# Patient Record
Sex: MALE | Race: OTHER | HISPANIC OR LATINO | URBAN - METROPOLITAN AREA
[De-identification: names, ages, dates, MRNs, and addresses within clinical notes are randomized per-mention and may not be internally consistent; named-entity substitution may affect disease eponyms.]

---

## 2018-11-05 RX ORDER — CIPROFLOXACIN LACTATE 400MG/40ML
1 VIAL (ML) INTRAVENOUS
Qty: 0 | Refills: 0 | COMMUNITY
Start: 2018-11-05

## 2018-11-07 ENCOUNTER — INPATIENT (INPATIENT)
Facility: HOSPITAL | Age: 23
LOS: 5 days | Discharge: HOME | DRG: 180 | End: 2018-11-13
Payer: COMMERCIAL

## 2018-11-07 VITALS
TEMPERATURE: 98 F | OXYGEN SATURATION: 94 % | DIASTOLIC BLOOD PRESSURE: 68 MMHG | WEIGHT: 216.05 LBS | SYSTOLIC BLOOD PRESSURE: 112 MMHG | RESPIRATION RATE: 16 BRPM | HEART RATE: 92 BPM

## 2018-11-07 DIAGNOSIS — R91.1 SOLITARY PULMONARY NODULE: ICD-10-CM

## 2018-11-07 DIAGNOSIS — Z29.9 ENCOUNTER FOR PROPHYLACTIC MEASURES, UNSPECIFIED: ICD-10-CM

## 2018-11-07 DIAGNOSIS — J45.909 UNSPECIFIED ASTHMA, UNCOMPLICATED: ICD-10-CM

## 2018-11-07 DIAGNOSIS — R04.2 HEMOPTYSIS: ICD-10-CM

## 2018-11-07 DIAGNOSIS — R63.8 OTHER SYMPTOMS AND SIGNS CONCERNING FOOD AND FLUID INTAKE: ICD-10-CM

## 2018-11-07 DIAGNOSIS — Z91.89 OTHER SPECIFIED PERSONAL RISK FACTORS, NOT ELSEWHERE CLASSIFIED: ICD-10-CM

## 2018-11-07 LAB
ALBUMIN SERPL ELPH-MCNC: 4.9 G/DL — SIGNIFICANT CHANGE UP (ref 3.3–5)
ALP SERPL-CCNC: 55 U/L — SIGNIFICANT CHANGE UP (ref 40–120)
ALT FLD-CCNC: 21 U/L — SIGNIFICANT CHANGE UP (ref 10–45)
ANION GAP SERPL CALC-SCNC: 15 MMOL/L — SIGNIFICANT CHANGE UP (ref 5–17)
APTT BLD: 29.5 SEC — SIGNIFICANT CHANGE UP (ref 27.5–36.3)
AST SERPL-CCNC: 18 U/L — SIGNIFICANT CHANGE UP (ref 10–40)
BASOPHILS NFR BLD AUTO: 0.1 % — SIGNIFICANT CHANGE UP (ref 0–2)
BILIRUB SERPL-MCNC: 0.5 MG/DL — SIGNIFICANT CHANGE UP (ref 0.2–1.2)
BUN SERPL-MCNC: 10 MG/DL — SIGNIFICANT CHANGE UP (ref 7–23)
CALCIUM SERPL-MCNC: 10 MG/DL — SIGNIFICANT CHANGE UP (ref 8.4–10.5)
CHLORIDE SERPL-SCNC: 100 MMOL/L — SIGNIFICANT CHANGE UP (ref 96–108)
CO2 SERPL-SCNC: 24 MMOL/L — SIGNIFICANT CHANGE UP (ref 22–31)
CREAT SERPL-MCNC: 1.13 MG/DL — SIGNIFICANT CHANGE UP (ref 0.5–1.3)
GLUCOSE SERPL-MCNC: 117 MG/DL — HIGH (ref 70–99)
HCT VFR BLD CALC: 46.2 % — SIGNIFICANT CHANGE UP (ref 39–50)
HGB BLD-MCNC: 15.7 G/DL — SIGNIFICANT CHANGE UP (ref 13–17)
INR BLD: 1.18 — HIGH (ref 0.88–1.16)
LYMPHOCYTES # BLD AUTO: 8.9 % — LOW (ref 13–44)
MCHC RBC-ENTMCNC: 30.4 PG — SIGNIFICANT CHANGE UP (ref 27–34)
MCHC RBC-ENTMCNC: 34 G/DL — SIGNIFICANT CHANGE UP (ref 32–36)
MCV RBC AUTO: 89.5 FL — SIGNIFICANT CHANGE UP (ref 80–100)
MONOCYTES NFR BLD AUTO: 2.1 % — SIGNIFICANT CHANGE UP (ref 2–14)
NEUTROPHILS NFR BLD AUTO: 88.9 % — HIGH (ref 43–77)
PLATELET # BLD AUTO: 317 K/UL — SIGNIFICANT CHANGE UP (ref 150–400)
POTASSIUM SERPL-MCNC: 4.6 MMOL/L — SIGNIFICANT CHANGE UP (ref 3.5–5.3)
POTASSIUM SERPL-SCNC: 4.6 MMOL/L — SIGNIFICANT CHANGE UP (ref 3.5–5.3)
PROT SERPL-MCNC: 8.3 G/DL — SIGNIFICANT CHANGE UP (ref 6–8.3)
PROTHROM AB SERPL-ACNC: 13.4 SEC — HIGH (ref 10–12.9)
RBC # BLD: 5.16 M/UL — SIGNIFICANT CHANGE UP (ref 4.2–5.8)
RBC # FLD: 13.1 % — SIGNIFICANT CHANGE UP (ref 10.3–16.9)
SODIUM SERPL-SCNC: 139 MMOL/L — SIGNIFICANT CHANGE UP (ref 135–145)
WBC # BLD: 8.4 K/UL — SIGNIFICANT CHANGE UP (ref 3.8–10.5)
WBC # FLD AUTO: 8.4 K/UL — SIGNIFICANT CHANGE UP (ref 3.8–10.5)

## 2018-11-07 PROCEDURE — 99222 1ST HOSP IP/OBS MODERATE 55: CPT | Mod: GC

## 2018-11-07 PROCEDURE — 99285 EMERGENCY DEPT VISIT HI MDM: CPT

## 2018-11-07 PROCEDURE — 71250 CT THORAX DX C-: CPT | Mod: 26

## 2018-11-07 RX ORDER — AZITHROMYCIN 500 MG/1
500 TABLET, FILM COATED ORAL ONCE
Qty: 0 | Refills: 0 | Status: COMPLETED | OUTPATIENT
Start: 2018-11-07 | End: 2018-11-07

## 2018-11-07 RX ORDER — AZITHROMYCIN 500 MG/1
500 TABLET, FILM COATED ORAL EVERY 24 HOURS
Qty: 0 | Refills: 0 | Status: DISCONTINUED | OUTPATIENT
Start: 2018-11-08 | End: 2018-11-13

## 2018-11-07 RX ORDER — CEFTRIAXONE 500 MG/1
1 INJECTION, POWDER, FOR SOLUTION INTRAMUSCULAR; INTRAVENOUS ONCE
Qty: 0 | Refills: 0 | Status: COMPLETED | OUTPATIENT
Start: 2018-11-07 | End: 2018-11-07

## 2018-11-07 RX ORDER — ALBUTEROL 90 UG/1
2 AEROSOL, METERED ORAL
Qty: 0 | Refills: 0 | COMMUNITY

## 2018-11-07 RX ORDER — CEFTRIAXONE 500 MG/1
1 INJECTION, POWDER, FOR SOLUTION INTRAMUSCULAR; INTRAVENOUS EVERY 24 HOURS
Qty: 0 | Refills: 0 | Status: DISCONTINUED | OUTPATIENT
Start: 2018-11-08 | End: 2018-11-13

## 2018-11-07 RX ADMIN — AZITHROMYCIN 255 MILLIGRAM(S): 500 TABLET, FILM COATED ORAL at 21:23

## 2018-11-07 RX ADMIN — CEFTRIAXONE 1 GRAM(S): 500 INJECTION, POWDER, FOR SOLUTION INTRAMUSCULAR; INTRAVENOUS at 20:00

## 2018-11-07 RX ADMIN — Medication 125 MILLIGRAM(S): at 17:03

## 2018-11-07 RX ADMIN — CEFTRIAXONE 100 GRAM(S): 500 INJECTION, POWDER, FOR SOLUTION INTRAMUSCULAR; INTRAVENOUS at 19:31

## 2018-11-07 NOTE — H&P ADULT - PROBLEM SELECTOR PLAN 3
History of Asthma. Controlled and Stable. Diagnosed 1 month ago. Controlled and Stable. On Flonase and Ventolin Inhaler BID.   - No wheezing appreciated on exam. Currently asymptomatic.

## 2018-11-07 NOTE — ED PROVIDER NOTE - PROGRESS NOTE DETAILS
notified by pt significant other that he coughed up blood into paper towel; assessed at bedside, estimated about 10-12cc bright red blood with punctate clots mixed with saliva. Also discussed case w/ Dr. Ashwini Ratliff in person who provides faxed copy of pertinent findings on outpatient CTA chest 11/6/18 -- read as soft tissue mass of the superior segment of LLL with [id number] size; ddx poss bronchogenic CA or lymphoma w/ poss L hilar mass; no evidence of PE or effusion. Will have scanned to chart.    Will obtain basic labs, coags, and give 125 solumedrol. discussed case w/ pulm Dr. Varela and saw with pt; will obtain NCCCT, tx with cfx/azithro for poss infectious etiology though to r/o malignancy will need admission for bronch tmrw, NPOpMN

## 2018-11-07 NOTE — H&P ADULT - HISTORY OF PRESENT ILLNESS
In the ED, VS were T(F): 98 (Max: 98.2), HR: 94 (92 - 94), BP: 124/75 (112/68 - 124/75), RR: 16, SpO2: 97% (94% - 97%) on RA.   CT Chest No Cont showing multilobulated masslike structure in the left lower lobe superior segment measuring approximately 6.0 x 2.5 x 4.1 cm high. The lesion extends into the left lower lobe and the superior segmental bronchi. The left hilum is prominent. Dilated bronchi are seen in the superior segment of the left lower lobe. EKG  Labs significant for WBC 8.4, NT 88%.   Received Ceftriaxone 1g IV x1, Azithromycin 500mg IV x1, Solumedrol 125mg IV x1,   Patient admitted for evaluation of hemoptysis and new lung mass found on CT. A 24yo Male with PMH of adult onset asthma (dx a month ago), frequent respiratory infections, seasonal allergies presents to [facility] after being referred for evaluation by Dr. Ashwini Ratliff after outpatient CTA chest revealed a "LLL lesion" in the setting of 2-3 days of cough and hemoptysis. States he has been experiencing 2-3 days of a shot glass worth of hemoptysis (last today at 4pm with 2-3 napkins of streaked blood, approx 5cc). States he has been wheezing and intermittently coughing (nonproductive) for the past 2 months. Endorses frequent respiratory infections, particularly as a child as well as seasonal allergies. Reports being treated with antibiotics previously for these infections with resolution of symptoms.     	Had BCG vax in 6/2018 (green card process; pt emigrated from [facility] in [id number]) and said started not feeling well weeks after vaccine. Denies prior PPD or QF-gold testing. No h/o TB contacts. Endorses recent sick contact w/ relative's infant with viral illness. Has traveled to [facility] in past 6-12mos. Frequently travels domestically in  part of job as  (last was in [place] few weeks ago). Also endorsed hiking in [place] few weeks ago but denies any exposures or going into caves.        In the ED, VS were T(F): 98 (Max: 98.2), HR: 94 (92 - 94), BP: 124/75 (112/68 - 124/75), RR: 16, SpO2: 97% (94% - 97%) on RA.   CT Chest No Cont showing multilobulated masslike structure in the left lower lobe superior segment measuring approximately 6.0 x 2.5 x 4.1 cm high. The lesion extends into the left lower lobe and the superior segmental bronchi. The left hilum is prominent. Dilated bronchi are seen in the superior segment of the left lower lobe. EKG  Labs significant for WBC 8.4, NT 88%.   Received Ceftriaxone 1g IV x1, Azithromycin 500mg IV x1, Solumedrol 125mg IV x1,   Patient admitted for evaluation of hemoptysis and new lung mass found on CT. A 22yo Male with PMH of adult onset asthma (dx a month ago, Flonase, Ventolin BID), frequent respiratory infections, seasonal allergies presents to [facility] after being referred for evaluation by Dr. Ashwini Ratliff after outpatient CTA chest revealed a "LLL lesion" in the setting of 2-3 days of cough and hemoptysis. States he has been experiencing 2-3 days of a shot glass worth of hemoptysis (last today at 4pm with 2-3 napkins of streaked blood, approx 5cc). Worse with lying flat. Improves with sitting up. States he has been wheezing and intermittently coughing (nonproductive) for the past 2 months. Was given Levaquin and Prednisone 40mg daily two days ago by Urgent Care. Endorses frequent respiratory infections, particularly as a child as well as seasonal allergies. Reports being treated with antibiotics previously for these infections (last one 2 years ago) with resolution of symptoms. Of note, had BCG vaccine in 6/2018 (green card process; patient emigrated from [facility] in [id number]) and said started not feeling well weeks after vaccine. Denies prior PPD or QF-gold testing. No h/o TB contacts. Endorses recent sick contact w/ relative's infant with viral illness and friend's partner. Has traveled to [facility] in past 6-12mos. 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 States he traveled on flights almost 150 times this past year and lives a very busy life. Also endorsed hiking in [place] few weeks ago but denies any exposures or going into caves. Former smoker [id number], 5 cigarettes a day. No RDU. Currently, denies fevers, chills, CP, SOB, abdominal pain, dysphagia, N/V/D, night sweats, urinary symptoms, lower extremity swelling, recent weight loss.     In the ED, VS were T(F): 98 (Max: 98.2), HR: 94 (92 - 94), BP: 124/75 (112/68 - 124/75), RR: 16, SpO2: 97% (94% - 97%) on RA. CT Chest No Cont showing multilobulated masslike structure in the left lower lobe superior segment measuring approximately 6.0 x 2.5 x 4.1 cm high. The lesion extends into the left lower lobe and the superior segmental bronchi. The left hilum is prominent. Dilated bronchi are seen in the superior segment of the left lower lobe. Labs significant for WBC 8.4, NT 88%. Received Ceftriaxone 1g IV x1, Azithromycin 500mg IV x1, Solumedrol 125mg IV x1, Patient admitted for evaluation of hemoptysis and new lung mass found on CT.

## 2018-11-07 NOTE — H&P ADULT - ATTENDING COMMENTS
Patient seen and examined with house-staff during bedside rounds.  Resident note read, including vitals, physical findings, laboratory data, and radiological reports.   Revisions included below.  Direct personal management at bed side and extensive interpretation of the data.  Plan was outlined and discussed in details with the housestaff.  Decision making of high complexity  Action taken for acute disease activity to reflect the level of care provided:  - medication reconciliation  - review laboratory data  The patient was referred to the emergency room because of hemoptysis. Patient has a CT scan report with revealed a mass in the left lower lobe. Patient had multiple episodes of pneumonia in the past. Repeat CT scan of the chest. Start antibiotic. Plan for bronchoscopy. I discussed the case in details with the patient and oncology

## 2018-11-07 NOTE — H&P ADULT - NSHPSOCIALHISTORY_GEN_ALL_CORE
Cigarette Use:  RDU:  ETOH:  Occupation: Cigarette Use: [id number], 5 cigarettes a day  RDU: Denies   ETOH: Socially  Occupation: E-commerce

## 2018-11-07 NOTE — ED ADULT NURSE NOTE - OBJECTIVE STATEMENT
patient presents to ED c/o hemoptysis x 2 days. Pt has CT performed yesterday that showed lesion on lung. pt denies sob, cp, n/v/d

## 2018-11-07 NOTE — H&P ADULT - PROBLEM SELECTOR PLAN 1
CT Chest No Cont showing multilobulated masslike structure in the left lower lobe superior segment measuring approximately 6.0 x 2.5 x 4.1 cm high. The lesion extends into the left lower lobe and the superior segmental bronchi. The left hilum is prominent. Dilated bronchi are seen in the superior segment of the left lower lobe.   - Plan for Bronchoscopy tomorrow with Dr. Varela  - HERONO after Midnight CT Chest No Cont showing multilobulated masslike structure in the left lower lobe superior segment measuring approximately 6.0 x 2.5 x 4.1 cm high. The lesion extends into the left lower lobe and the superior segmental bronchi. The left hilum is prominent. Dilated bronchi are seen in the superior segment of the left lower lobe.   - Plan for Bronchoscopy tomorrow with Dr. Varela  - Type and Screen Active  - NPO after Midnight  - f/u AM labs, EKG

## 2018-11-07 NOTE — ED PROVIDER NOTE - MEDICAL DECISION MAKING DETAILS
22yo M presenting with 2-3d of "shot-glass"-worth intermittent hemoptysis, with outside CTA demonstrating a LLL lesion of concern for malignant vs. infectious etiology; hemoptysis was witnessed in ED, VSS; discussed case w/ pulm Dr. Varela, check NCCCT in ED, basic labs, empiric abx CFX/azithro for poss PNA, and admit for bronchoscopy tomorrow for LLL lesion

## 2018-11-07 NOTE — H&P ADULT - ASSESSMENT
A 22yo Male with PMH of adult onset asthma (dx a month ago, Flonase, Ventolin BID), frequent respiratory infections, seasonal allergies presents to [facility] after being referred for evaluation by Dr. Ashwini Ratliff after outpatient CTA chest revealed a "LLL lesion" in the setting of 2-3 days of cough and hemoptysis, with inpatient [facility] showing multilobulated masslike structure in the left lower lobe superior segment measuring approximately 6.0 x 2.5 x 4.1 cm high extending into the left lower lobe and the superior segmental bronchi, admitted for evaluation of hemoptysis in setting of new lung mass.

## 2018-11-07 NOTE — ED PROVIDER NOTE - ATTENDING CONTRIBUTION TO CARE
23 m no sig pmh- w multiple childhood resp infection-chronic cough hemoptysis x 3 days- small amt, shot glass size  got bcg in june- August had scant hemoptysis- mild amt of streaking/phlegm  had CT angio 23 m no sig pmh- w multiple childhood resp infection-chronic cough hemoptysis x 3 days- small amt, shot glass size  got bcg in june- August had scant hemoptysis- mild amt of streaking/phlegm  had CT angio- shows Left lower lober 'lesion' sent in by Dr Ratliff to see Dr Varela  int night sweats  vss  s1s2 lungs cta bl  abd soft nt nd +bs  ext no c/c/e  IMP- Cough- scant hemoptysis  w abnml CT  labs, consult Pulm

## 2018-11-07 NOTE — ED ADULT NURSE NOTE - NSIMPLEMENTINTERV_GEN_ALL_ED
Implemented All Universal Safety Interventions:  [place] to call system. Call bell, personal items and telephone within reach. Instruct patient to call for assistance. Room bathroom lighting operational. Non-slip footwear when patient is off stretcher. Physically safe environment: no spills, clutter or unnecessary equipment. Stretcher in lowest position, wheels locked, appropriate side rails in place.

## 2018-11-07 NOTE — H&P ADULT - NSHPLABSRESULTS_GEN_ALL_CORE
LABS:                        15.7   8.4   )-----------( 317      ( 07 Nov 2018 17:31 )             46.2     11-07    139  |  100  |  10  ----------------------------<  117<H>  4.6   |  24  |  1.13    Ca    10.0      07 Nov 2018 17:31    TPro  8.3  /  Alb  4.9  /  TBili  0.5  /  DBili  x   /  AST  18  /  ALT  21  /  AlkPhos  55  11-07    PT/INR - ( 07 Nov 2018 17:31 )   PT: 13.4 sec;   INR: 1.18          PTT - ( 07 Nov 2018 17:31 )  PTT:29.5 sec

## 2018-11-07 NOTE — H&P ADULT - NSHPOUTPATIENTPROVIDERS_GEN_ALL_CORE
PCP:  Pharmacy: PCP: None  Pharmacy: [facility] in [place], [place]; [facility] PCP: None  Wife: Odilia Cavazos [phone]  Pharmacy: [facility] in [place], [place]; [facility]

## 2018-11-07 NOTE — H&P ADULT - NSHPPHYSICALEXAM_GEN_ALL_CORE
Vital Signs Last 12 Hrs  T(F): 98 (11-07-18 @ 15:23), Max: 98.2 (11-07-18 @ 14:42)  HR: 94 (11-07-18 @ 15:23) (92 - 94)  BP: 124/75 (11-07-18 @ 15:23) (112/68 - 124/75)  BP(mean): --  RR: 16 (11-07-18 @ 15:23) (16 - 16)  SpO2: 97% (11-07-18 @ 15:23) (94% - 97%)  I&O's Summary      PHYSICAL EXAM:    Constitutional: NAD, AAOx3, comfortable in bed.   Respiratory: Normal rate, rhythm, depth, effort. CTAB. No w/r/r.   Cardiovascular: RRR, normal S1 and S2, no m/r/g.   Gastrointestinal: +BS, soft NTND.   Extremities: wwp, no edema.   Vascular: Pulses equal and strong throughout.   Neurological: Cranial nerves grossly intact, strength and sensation intact throughout.   Skin: No gross skin abnormalities. Vital Signs Last 12 Hrs  T(F): 98 (11-07-18 @ 15:23), Max: 98.2 (11-07-18 @ 14:42)  HR: 94 (11-07-18 @ 15:23) (92 - 94)  BP: 124/75 (11-07-18 @ 15:23) (112/68 - 124/75)  BP(mean): --  RR: 16 (11-07-18 @ 15:23) (16 - 16)  SpO2: 97% (11-07-18 @ 15:23) (94% - 97%)  I&O's Summary      PHYSICAL EXAM:    Constitutional: Resting comfortably in bed in NAD, AAOx3, Anxious  HEENT: PERRL, EOMI, anicteric, MMM, neck supple, no JVD, no thyromegaly  Respiratory: Slight decrease in BS AMANDEEP, otherwise, no wheezing rhonchi or rales appreciated  Cardiovascular: RRR, normal S1 and S2, no m/r/g.   Gastrointestinal: +BS x 4, soft NTND. No peritoneal signs.   Extremities: wwp, no edema.   Vascular: Pulses equal and strong throughout.   Neurological: Cranial nerves grossly intact, strength and sensation intact throughout.   Skin: No gross skin abnormalities.

## 2018-11-07 NOTE — H&P ADULT - PROBLEM SELECTOR PLAN 6
1) PCP Contacted on Admission: (Y/N) --> Name & Phone #:  2) Date of Contact with PCP:  3) PCP Contacted at Discharge: (Y/N, N/A)  4) Summary of Handoff Given to PCP:   5) Post-Discharge Appointment Date and Location: 1) PCP Contacted on Admission: (N) --> Name & Phone #: No PCP, link with Dr. Varela or [facility] post discharge  2) Date of Contact with PCP:  3) PCP Contacted at Discharge: (Y/N, N/A)  4) Summary of Handoff Given to PCP:   5) Post-Discharge Appointment Date and Location:

## 2018-11-07 NOTE — H&P ADULT - PROBLEM SELECTOR PLAN 2
Patient with 2-3 days of hemoptysis, described as shot glass full. Last this afternoon at 4pm with 2-3 napkins, of blood streaks, approx 5-10cc. Currently hemoptysis free.  - Likely in setting of PNA vs TB vs new lung mass found on CT  - f/u AM CBC  - Type and Screen Active  - Maintain 2 Peripheral Large Bore IVs  - Transfusion Goal Hgb > 7  - Plan as described as above.

## 2018-11-07 NOTE — ED ADULT TRIAGE NOTE - CHIEF COMPLAINT QUOTE
Pt referred to ED by DR DARRION ferrari to be seen by DR Varela and for possible bronchoscopy - per records pt has a lesion of LT lower lung field. Pt denies pain, shortness of breath or chest/back pain. Pt reports hemoptysis in the last two days. Pt referred to ED by DR DARRION Ratliff to be seen by DR Varela and for possible bronchoscopy - per records pt has a lesion of LT lower lung field. Pt denies pain, shortness of breath or chest/back pain. Pt reports hemoptysis in the last two days.

## 2018-11-07 NOTE — ED PROVIDER NOTE - OBJECTIVE STATEMENT
22yo M w/ PMH asthma, frequent respiratory infections, seasonal allergies referred to ED for evaluation by Dr. Ashwini Ratliff after outpatient CTA chest revealed a "LLL lesion" in the setting of 2-3 days of cough and hemoptysis. Patient says he has had about a shot glass worth of hemoptysis between Monday and Tuesday/tuesday evening. No hemoptysis today. Has been coughing for the last 4-5 months intermittently. Usually dry, but endorses it is sometimes productive of sputum. Endorses frequent respiratory infections, particularly as a child and seasonal allergies. 24yo M w/ PMH asthma, frequent respiratory infections, seasonal allergies referred to ED for evaluation by Dr. Ashwini Ratliff after outpatient CTA chest revealed a "LLL lesion" in the setting of 2-3 days of cough and hemoptysis. Patient says he has had about a shot glass worth of hemoptysis between Monday and Tuesday/tuesday evening. No hemoptysis today. Has been coughing for the last 4-5 months intermittently. Usually dry, but endorses it is sometimes productive of sputum. Endorses frequent respiratory infections, particularly as a child as well as seasonal allergies.    Had BCG vax in 6/2018 (green card process; pt emigrated from [facility] in [id number]) and said started not feeling well weeks after vaccine. Denies prior PPD or QF-gold testing. No h/o TB contacts. Endorses recent sick contact w/ relative's infant with viral illness. Has traveled to [facility] in past 6-12mos. Frequently travels domestically in  part of job as  (last was in [place] few weeks ago). Also endorsed hiking in [place] few weeks ago but denies any exposures or going into caves.

## 2018-11-08 DIAGNOSIS — D72.829 ELEVATED WHITE BLOOD CELL COUNT, UNSPECIFIED: ICD-10-CM

## 2018-11-08 LAB
ANION GAP SERPL CALC-SCNC: 16 MMOL/L — SIGNIFICANT CHANGE UP (ref 5–17)
BASOPHILS NFR BLD AUTO: 0.1 % — SIGNIFICANT CHANGE UP (ref 0–2)
BUN SERPL-MCNC: 14 MG/DL — SIGNIFICANT CHANGE UP (ref 7–23)
CALCIUM SERPL-MCNC: 10.2 MG/DL — SIGNIFICANT CHANGE UP (ref 8.4–10.5)
CHLORIDE SERPL-SCNC: 101 MMOL/L — SIGNIFICANT CHANGE UP (ref 96–108)
CO2 SERPL-SCNC: 24 MMOL/L — SIGNIFICANT CHANGE UP (ref 22–31)
CREAT SERPL-MCNC: 1.01 MG/DL — SIGNIFICANT CHANGE UP (ref 0.5–1.3)
EOSINOPHIL NFR BLD AUTO: 0 % — SIGNIFICANT CHANGE UP (ref 0–6)
GLUCOSE SERPL-MCNC: 120 MG/DL — HIGH (ref 70–99)
HCT VFR BLD CALC: 45.9 % — SIGNIFICANT CHANGE UP (ref 39–50)
HGB BLD-MCNC: 15.6 G/DL — SIGNIFICANT CHANGE UP (ref 13–17)
LYMPHOCYTES # BLD AUTO: 11.4 % — LOW (ref 13–44)
MAGNESIUM SERPL-MCNC: 2.2 MG/DL — SIGNIFICANT CHANGE UP (ref 1.6–2.6)
MCHC RBC-ENTMCNC: 30.6 PG — SIGNIFICANT CHANGE UP (ref 27–34)
MCHC RBC-ENTMCNC: 34 G/DL — SIGNIFICANT CHANGE UP (ref 32–36)
MCV RBC AUTO: 90 FL — SIGNIFICANT CHANGE UP (ref 80–100)
MONOCYTES NFR BLD AUTO: 8.1 % — SIGNIFICANT CHANGE UP (ref 2–14)
NEUTROPHILS NFR BLD AUTO: 80.4 % — HIGH (ref 43–77)
PLATELET # BLD AUTO: 314 K/UL — SIGNIFICANT CHANGE UP (ref 150–400)
POTASSIUM SERPL-MCNC: 4.4 MMOL/L — SIGNIFICANT CHANGE UP (ref 3.5–5.3)
POTASSIUM SERPL-SCNC: 4.4 MMOL/L — SIGNIFICANT CHANGE UP (ref 3.5–5.3)
RBC # BLD: 5.1 M/UL — SIGNIFICANT CHANGE UP (ref 4.2–5.8)
RBC # FLD: 13.1 % — SIGNIFICANT CHANGE UP (ref 10.3–16.9)
SODIUM SERPL-SCNC: 141 MMOL/L — SIGNIFICANT CHANGE UP (ref 135–145)
WBC # BLD: 16.4 K/UL — HIGH (ref 3.8–10.5)
WBC # FLD AUTO: 16.4 K/UL — HIGH (ref 3.8–10.5)

## 2018-11-08 PROCEDURE — 93010 ELECTROCARDIOGRAM REPORT: CPT

## 2018-11-08 PROCEDURE — 99232 SBSQ HOSP IP/OBS MODERATE 35: CPT | Mod: GC

## 2018-11-08 PROCEDURE — 71275 CT ANGIOGRAPHY CHEST: CPT | Mod: 26

## 2018-11-08 RX ORDER — IPRATROPIUM/ALBUTEROL SULFATE 18-103MCG
3 AEROSOL WITH ADAPTER (GRAM) INHALATION EVERY 6 HOURS
Qty: 0 | Refills: 0 | Status: DISCONTINUED | OUTPATIENT
Start: 2018-11-08 | End: 2018-11-13

## 2018-11-08 RX ORDER — IPRATROPIUM/ALBUTEROL SULFATE 18-103MCG
3 AEROSOL WITH ADAPTER (GRAM) INHALATION EVERY 6 HOURS
Qty: 0 | Refills: 0 | Status: DISCONTINUED | OUTPATIENT
Start: 2018-11-08 | End: 2018-11-08

## 2018-11-08 RX ADMIN — CEFTRIAXONE 100 GRAM(S): 500 INJECTION, POWDER, FOR SOLUTION INTRAMUSCULAR; INTRAVENOUS at 17:15

## 2018-11-08 RX ADMIN — AZITHROMYCIN 255 MILLIGRAM(S): 500 TABLET, FILM COATED ORAL at 18:36

## 2018-11-08 NOTE — PROGRESS NOTE ADULT - PROBLEM SELECTOR PLAN 1
Pt with 3 days of shot full of hemoptysis accompanied by wheezing and productive cough, saw Dr. Ratliff on outpatient with CT illustrating LLL lesion, sent to ED for workup of Mass and hemoptysis. Patient endorses night sweats intermittent since BCG vaccine but denies any weight loss. Possible etiologies include malignancy (patient former smoker 5cigs/day for 2 years although less likely young age/not heavy smoker/no fam HX, but mass presence.), vs vasculitis (hemoptysis, episode of malaise, cough, night sweats, but no other signs of epistaxis/ulcerations, hematuria and less likely with mass present), vs TB activation (hemoptysis, night sweats, chronicity of cough, subsequent timing right after BCG vaccine, born in [facility], extensive travel history, but no weight loss, no LN on CT and multilobular) , vs AVM (hemoptysis but less likely with consitutional sx and no other site).   - Hb stable, but will maintain active T&S in light of current episodes  - Plan for bronchoscopy today for evaluation of mass  - Consider vasculitis panel- ANCA Pt with 3 days of shot full of hemoptysis accompanied by wheezing and productive cough, saw Dr. Ratliff on outpatient with CT illustrating LLL lesion, sent to ED for workup of Mass and hemoptysis. Patient endorses night sweats intermittent since BCG vaccine but denies any weight loss. Possible etiologies include AVM (hemoptysis) malignancy (patient former smoker 5cigs/day for 2 years although less likely young age/not heavy smoker/no fam HX, but mass presence.), vs vasculitis (hemoptysis, episode of malaise, cough, night sweats, but no other signs of epistaxis/ulcerations, hematuria and less likely with mass present).   - Hb stable, but will maintain active T&S in light of current episodes  - CT angio w/ IV contrast- for today to rule out AVM   - Plan for bronchoscopy tomorrow for evaluation of mass  - Consider vasculitis/antibodies if negative for AVM on scan   - If severe hemoptysis again, get stat CBC and if not protecting airway/ tachypneic page anesthesia stat for possible intubation   - Heme/Onc following

## 2018-11-08 NOTE — PROGRESS NOTE ADULT - PROBLEM SELECTOR PLAN 4
Recently diagnosed with wheezing on outpatient; currently Recently diagnosed with wheezing on outpatient; currently on outpatient Ventolin/ Fluticasone;   - C/w jose luis

## 2018-11-08 NOTE — PROGRESS NOTE ADULT - SUBJECTIVE AND OBJECTIVE BOX
OVERNIGHT EVENTS: No hemoptysis episodes.     SUBJECTIVE / INTERVAL HPI: Patient seen and examined at bedside. Pt mentions overnight he had episodes of night sweats, which he endorses he has had intermittently since July. His breathing has improved denies any current wheezing, endorsing     VITAL SIGNS:  Vital Signs Last 24 Hrs  T(C): 36.7 (08 Nov 2018 05:35), Max: 36.8 (07 Nov 2018 14:42)  T(F): 98 (08 Nov 2018 05:35), Max: 98.2 (07 Nov 2018 14:42)  HR: 89 (08 Nov 2018 05:35) (89 - 97)  BP: 110/68 (08 Nov 2018 05:35) (110/68 - 125/81)  BP(mean): --  RR: 17 (08 Nov 2018 05:35) (16 - 17)  SpO2: 95% (08 Nov 2018 05:35) (94% - 97%)    PHYSICAL EXAM:    General: WDWN  HEENT: NC/AT; PERRL, anicteric sclera; MMM  Neck: supple  Cardiovascular: +S1/S2, RRR  Respiratory: CTA B/L; no W/R/R  Gastrointestinal: soft, NT/ND; +BSx4  Extremities: WWP; no edema, clubbing or cyanosis  Vascular: 2+ radial, DP/PT pulses B/L  Neurological: AAOx3; no focal deficits    MEDICATIONS:  MEDICATIONS  (STANDING):  azithromycin  IVPB 500 milliGRAM(s) IV Intermittent every 24 hours  cefTRIAXone   IVPB 1 Gram(s) IV Intermittent every 24 hours    MEDICATIONS  (PRN):      ALLERGIES:  Allergies    No Known Allergies    Intolerances        LABS:                        15.6   16.4  )-----------( 314      ( 08 Nov 2018 07:04 )             45.9     11-08    141  |  101  |  14  ----------------------------<  120<H>  4.4   |  24  |  1.01    Ca    10.2      08 Nov 2018 07:04  Mg     2.2     11-08    TPro  8.3  /  Alb  4.9  /  TBili  0.5  /  DBili  x   /  AST  18  /  ALT  21  /  AlkPhos  55  11-07    PT/INR - ( 07 Nov 2018 17:31 )   PT: 13.4 sec;   INR: 1.18          PTT - ( 07 Nov 2018 17:31 )  PTT:29.5 sec    CAPILLARY BLOOD GLUCOSE          RADIOLOGY & ADDITIONAL TESTS: Reviewed. OVERNIGHT EVENTS: No hemoptysis episodes.     SUBJECTIVE / INTERVAL HPI: Patient seen and examined at bedside. Pt mentions overnight he had episodes of night sweats, which he endorses he has had intermittently since July. His breathing has improved denies any current wheezing, endorsing breathing treatment helped. Patient is currently on CFTX/Azithro NPO pending Bronchoscopy this AM.     VITAL SIGNS:  Vital Signs Last 24 Hrs  T(C): 36.7 (08 Nov 2018 05:35), Max: 36.8 (07 Nov 2018 14:42)  T(F): 98 (08 Nov 2018 05:35), Max: 98.2 (07 Nov 2018 14:42)  HR: 89 (08 Nov 2018 05:35) (89 - 97)  BP: 110/68 (08 Nov 2018 05:35) (110/68 - 125/81)  BP(mean): --  RR: 17 (08 Nov 2018 05:35) (16 - 17)  SpO2: 95% (08 Nov 2018 05:35) (94% - 97%)    PHYSICAL EXAM:    General:young healthy appearing male in NAD   HEENT: NC/AT; PERRL, anicteric sclera; MMM  Neck: supple  Cardiovascular: +S1/S2, RRR  Respiratory: CTA B/L; no wheezing appreciated  Gastrointestinal: soft, NT/ND; +BSx4  Extremities: WWP; no edema, clubbing or cyanosis  Vascular: 2+ radial, DP/PT pulses B/L  Neurological: AAOx3; no focal deficits    MEDICATIONS:  MEDICATIONS  (STANDING):  azithromycin  IVPB 500 milliGRAM(s) IV Intermittent every 24 hours  cefTRIAXone   IVPB 1 Gram(s) IV Intermittent every 24 hours    MEDICATIONS  (PRN):      ALLERGIES:  Allergies    No Known Allergies    Intolerances        LABS:                        15.6   16.4  )-----------( 314      ( 08 Nov 2018 07:04 )             45.9     11-08    141  |  101  |  14  ----------------------------<  120<H>  4.4   |  24  |  1.01    Ca    10.2      08 Nov 2018 07:04  Mg     2.2     11-08    TPro  8.3  /  Alb  4.9  /  TBili  0.5  /  DBili  x   /  AST  18  /  ALT  21  /  AlkPhos  55  11-07    PT/INR - ( 07 Nov 2018 17:31 )   PT: 13.4 sec;   INR: 1.18          PTT - ( 07 Nov 2018 17:31 )  PTT:29.5 sec    CAPILLARY BLOOD GLUCOSE          RADIOLOGY & ADDITIONAL TESTS: Reviewed. OVERNIGHT EVENTS: No hemoptysis episodes.     SUBJECTIVE / INTERVAL HPI: Patient seen and examined at bedside. Pt mentions overnight he had episodes of night sweats, which he endorses he has had intermittently since July. His breathing has improved denies any current wheezing, endorsing breathing treatment helped. Patient is currently on CFTX/Azithro NPO pending Bronchoscopy this AM.     VITAL SIGNS:  Vital Signs Last 24 Hrs  T(C): 36.7 (08 Nov 2018 05:35), Max: 36.8 (07 Nov 2018 14:42)  T(F): 98 (08 Nov 2018 05:35), Max: 98.2 (07 Nov 2018 14:42)  HR: 89 (08 Nov 2018 05:35) (89 - 97)  BP: 110/68 (08 Nov 2018 05:35) (110/68 - 125/81)  BP(mean): --  RR: 17 (08 Nov 2018 05:35) (16 - 17)  SpO2: 95% (08 Nov 2018 05:35) (94% - 97%)    PHYSICAL EXAM:    General: young healthy appearing male in NAD   HEENT: NC/AT; PERRL, anicteric sclera; MMM  Neck: supple  Cardiovascular: +S1/S2, RRR  Respiratory: CTA B/L; no wheezing appreciated  Gastrointestinal: soft, NT/ND; +BSx4  Extremities: WWP; no edema, clubbing or cyanosis  Vascular: 2+ radial, DP/PT pulses B/L  Neurological: AAOx3; no focal deficits    MEDICATIONS:  MEDICATIONS  (STANDING):  azithromycin  IVPB 500 milliGRAM(s) IV Intermittent every 24 hours  cefTRIAXone   IVPB 1 Gram(s) IV Intermittent every 24 hours    MEDICATIONS  (PRN):      ALLERGIES:  Allergies    No Known Allergies    Intolerances        LABS:                        15.6   16.4  )-----------( 314      ( 08 Nov 2018 07:04 )             45.9     11-08    141  |  101  |  14  ----------------------------<  120<H>  4.4   |  24  |  1.01    Ca    10.2      08 Nov 2018 07:04  Mg     2.2     11-08    TPro  8.3  /  Alb  4.9  /  TBili  0.5  /  DBili  x   /  AST  18  /  ALT  21  /  AlkPhos  55  11-07    PT/INR - ( 07 Nov 2018 17:31 )   PT: 13.4 sec;   INR: 1.18          PTT - ( 07 Nov 2018 17:31 )  PTT:29.5 sec    CAPILLARY BLOOD GLUCOSE          RADIOLOGY & ADDITIONAL TESTS: Reviewed.

## 2018-11-08 NOTE — PROGRESS NOTE ADULT - ASSESSMENT
23yoM with PMH of Adult onset asthma (dx a month ago, Flonase, Ventolin BID), frequent respiratory infections (Bronchitis childhood, seasonal allergies presents to [facility] after being referred for evaluation by Dr. Ashwini Ratliff after outpatient CTA chest revealed a "LLL lesion" in the setting of 2-3 days of cough and hemoptysis, with inpatient [facility] showing multilobulated masslike structure in the left lower lobe superior segment measuring approximately 6.0 x 2.5 x 4.1 cm high extending into the left lower lobe and the superior segmental bronchi, admitted for evaluation of hemoptysis in setting of new lung mass. 23yoM with PMH of Adult onset asthma (dx a month ago, Flonase, Ventolin BID), frequent respiratory infections (Bronchitis childhood, 2 bronchitis as young adult) who presents for wheezing      seasonal allergies presents to [facility] after being referred for evaluation by Dr. Ashwini Ratliff after outpatient CTA chest revealed a "LLL lesion" in the setting of 2-3 days of cough and hemoptysis, with inpatient NCCTC showing multilobulated masslike structure in the left lower lobe superior segment measuring approximately 6.0 x 2.5 x 4.1 cm high extending into the left lower lobe and the superior segmental bronchi, admitted for evaluation of hemoptysis in setting of new lung mass. 23yoM with PMH of Adult onset asthma (dx a month ago, Flonase, Ventolin BID), frequent respiratory infections (Bronchitis childhood, 2 bronchitis as young adult) who presents  11/7 for hemoptysis, mentioning since BCG vaccine June 9th, 3 days later he began having cough/malaise/subjective fever/night sweats that then seemed to subside, with residual wheezing and diagnosis of Asthma, but over the last week he began having night sweats/productive cough and over the last 2-3 days had shot glass full of hemoptysis s/p seeing Dr. Ratliff on outpatient with CT illustrating LLL lesion, sent to ED for workup of Mass and hemoptysis, currently being treated with CFTX/Azithro for CAP.

## 2018-11-08 NOTE — PROGRESS NOTE ADULT - SUBJECTIVE AND OBJECTIVE BOX
Hx and PE as per Femi Varela and Adryan.    I saw the ptient yesterday for first visit and reviewed the hx and PE.    I agree with plans for bronchoscopy.     No hemoptysis overnight.

## 2018-11-08 NOTE — PROGRESS NOTE ADULT - PROBLEM SELECTOR PLAN 5
F: None tolerating PO   E: Replete for K<4 Mag<2  N: Regular Diet; NPO after 12am for Bronchoscopy   A: None Improve 0, and with hemoptysis; SCD

## 2018-11-09 ENCOUNTER — RESULT REVIEW (OUTPATIENT)
Age: 23
End: 2018-11-09

## 2018-11-09 PROBLEM — J30.2 OTHER SEASONAL ALLERGIC RHINITIS: Chronic | Status: ACTIVE | Noted: 2018-11-07

## 2018-11-09 PROBLEM — Z00.00 ENCOUNTER FOR PREVENTIVE HEALTH EXAMINATION: Status: ACTIVE | Noted: 2018-11-09

## 2018-11-09 PROBLEM — J45.909 UNSPECIFIED ASTHMA, UNCOMPLICATED: Chronic | Status: ACTIVE | Noted: 2018-11-07

## 2018-11-09 LAB
ANION GAP SERPL CALC-SCNC: 11 MMOL/L — SIGNIFICANT CHANGE UP (ref 5–17)
APTT BLD: 26.2 SEC — LOW (ref 27.5–36.3)
B PERT IGG+IGM PNL SER: SIGNIFICANT CHANGE UP
BUN SERPL-MCNC: 18 MG/DL — SIGNIFICANT CHANGE UP (ref 7–23)
CALCIUM SERPL-MCNC: 9.9 MG/DL — SIGNIFICANT CHANGE UP (ref 8.4–10.5)
CHLORIDE SERPL-SCNC: 98 MMOL/L — SIGNIFICANT CHANGE UP (ref 96–108)
CO2 SERPL-SCNC: 29 MMOL/L — SIGNIFICANT CHANGE UP (ref 22–31)
COLOR FLD: SIGNIFICANT CHANGE UP
COMMENT - FLUIDS: SIGNIFICANT CHANGE UP
CREAT SERPL-MCNC: 1.2 MG/DL — SIGNIFICANT CHANGE UP (ref 0.5–1.3)
EOSINOPHIL # FLD: 1 % — SIGNIFICANT CHANGE UP
FLUID INTAKE SUBSTANCE CLASS: SIGNIFICANT CHANGE UP
FLUID SEGMENTED GRANULOCYTES: 95 % — SIGNIFICANT CHANGE UP
GLUCOSE SERPL-MCNC: 99 MG/DL — SIGNIFICANT CHANGE UP (ref 70–99)
HCT VFR BLD CALC: 45.9 % — SIGNIFICANT CHANGE UP (ref 39–50)
HGB BLD-MCNC: 15.3 G/DL — SIGNIFICANT CHANGE UP (ref 13–17)
INR BLD: 1.06 — SIGNIFICANT CHANGE UP (ref 0.88–1.16)
LYMPHOCYTES # FLD: 3 % — SIGNIFICANT CHANGE UP
MCHC RBC-ENTMCNC: 30.5 PG — SIGNIFICANT CHANGE UP (ref 27–34)
MCHC RBC-ENTMCNC: 33.3 G/DL — SIGNIFICANT CHANGE UP (ref 32–36)
MCV RBC AUTO: 91.6 FL — SIGNIFICANT CHANGE UP (ref 80–100)
MONOS+MACROS # FLD: 1 % — SIGNIFICANT CHANGE UP
PLATELET # BLD AUTO: 268 K/UL — SIGNIFICANT CHANGE UP (ref 150–400)
POTASSIUM SERPL-MCNC: 4.6 MMOL/L — SIGNIFICANT CHANGE UP (ref 3.5–5.3)
POTASSIUM SERPL-SCNC: 4.6 MMOL/L — SIGNIFICANT CHANGE UP (ref 3.5–5.3)
PROTHROM AB SERPL-ACNC: 12 SEC — SIGNIFICANT CHANGE UP (ref 10–12.9)
RBC # BLD: 5.01 M/UL — SIGNIFICANT CHANGE UP (ref 4.2–5.8)
RBC # FLD: 13.5 % — SIGNIFICANT CHANGE UP (ref 10.3–16.9)
RCV VOL RI: HIGH /UL (ref 0–5)
SODIUM SERPL-SCNC: 138 MMOL/L — SIGNIFICANT CHANGE UP (ref 135–145)
SPECIMEN SOURCE FLD: SIGNIFICANT CHANGE UP
TOTAL NUCLEATED CELL COUNT, BODY FLUID: 289 /UL — HIGH (ref 0–5)
TUBE TYPE: SIGNIFICANT CHANGE UP
WBC # BLD: 8.9 K/UL — SIGNIFICANT CHANGE UP (ref 3.8–10.5)
WBC # FLD AUTO: 8.9 K/UL — SIGNIFICANT CHANGE UP (ref 3.8–10.5)

## 2018-11-09 PROCEDURE — 99223 1ST HOSP IP/OBS HIGH 75: CPT

## 2018-11-09 PROCEDURE — 31622 DX BRONCHOSCOPE/WASH: CPT | Mod: GC

## 2018-11-09 PROCEDURE — 99233 SBSQ HOSP IP/OBS HIGH 50: CPT | Mod: GC,25

## 2018-11-09 RX ADMIN — AZITHROMYCIN 255 MILLIGRAM(S): 500 TABLET, FILM COATED ORAL at 18:14

## 2018-11-09 RX ADMIN — CEFTRIAXONE 100 GRAM(S): 500 INJECTION, POWDER, FOR SOLUTION INTRAMUSCULAR; INTRAVENOUS at 17:37

## 2018-11-09 NOTE — PROGRESS NOTE ADULT - PROBLEM SELECTOR PLAN 5
F: None tolerating PO   E: Replete for K<4 Mag<2  N: NPO  for Bronchoscopy   A: None Improve 0, and with hemoptysis; SCD

## 2018-11-09 NOTE — PROGRESS NOTE ADULT - ASSESSMENT
23yoM with PMH of Adult onset asthma (dx a month ago, Flonase, Ventolin BID), frequent respiratory infections (Bronchitis childhood, 2 bronchitis as young adult) who presents  11/7 for hemoptysis, mentioning since BCG vaccine June 9th, 3 days later he began having cough/malaise/subjective fever/night sweats that then seemed to subside, with residual wheezing and diagnosis of Asthma, but over the last week he began having night sweats/productive cough and over the last 2-3 days had shot glass full of hemoptysis s/p seeing Dr. Ratliff on outpatient with CT illustrating LLL lesion, sent to ED for workup of Mass and hemoptysis, currently being treated with CFTX/Azithro prophylactically for CAP.

## 2018-11-09 NOTE — BRIEF OPERATIVE NOTE - PROCEDURE
<<-----Click on this checkbox to enter Procedure Flexible bronchoscopy in adult  11/09/2018    Active  VICTORIANO

## 2018-11-09 NOTE — PROGRESS NOTE ADULT - ASSESSMENT
22 y/o male history of pneumonia (2013), newly diagnosed asthma, presented to [facility] with navin hemoptysis, found to have new LLL mass with endobronchial involvement and mucoid impaction.     #Lung Mass    Work up, ongoing patient planned for excision of mass on 11/12 by CTS, s/p bronchoscopy in AM concerning for large obstructing lobular lesion with endobronchial involvement bleeding. Mass 3.2 cm in largest dimension per imaging, no other gross adenopathy or lesions clinically or on imaging. Pt with previous hx of pneumonia in L lung. No fam hx of malignancy. Intermittent smoking hx.     -close monitoring inpatient, monitor H/H, active T+S given high bleeding risk   -chromogranin A level (per imaging carcinoid on differential)   -will await tissue diagnosis   -CT Abd/Pelvis w/ contrast, consider PET/CT   -ambulating/Venodynes for dvt ppx       Discussed w/ Dr. Ratliff

## 2018-11-09 NOTE — CONSULT NOTE ADULT - ASSESSMENT
23M with recently diagnosed adult onset asthma 1 month ago started on Ventolin without relief comes in with 3 days of hemoptysis (none since day of admission 11/7), found to have CTA chest evidence of LLL mass. Underwent bronchoscopy this AM with plans for excision by CT surgery on Monday. ICU consulted for need for closer monitoring.    #LLL mass  Pt with newfound LLL mass seen initially several days ago on outpatient CTA chest and again seen on CT chest and CTA chest performed this admission. Pt without respiratory distress from the mass at this time. No further hemoptysis episodes since 3pm on 11/7. Pt remains hemodynamically stable and in no respiratory distress. Bronchoscopy performed earlier this AM. Type of mass unclear at this time, ?carcinoid. Awaiting excision of mass on Monday by CT surgery.  - monitor on tele  - CT surgery following, f/u recs  - heme/onc following, f/u recs  - Hb stable, maintain active T&S  - if further hemoptysis, obtain STAT CBC and assess for airway protection    #postobstructive pneumonia  Pt with "postobstructive mucoid impaction" seen on CT angio chest with leukocytosis with neutrophil predominance on 11/8.   - c/w IV CFTX/azithromycin    #adult onset asthma  Pt recently diagnosed 1 month ago and started on Flonase and Ventolin BID. As per history, unclear if pt truly asthmatic or if 2/2 bronchial mass.  - c/w jose luis PRN    Dispo: [facility] under Dr. Varela    Discussed with Dr. Varela and pulm fellow. 23M with recently diagnosed adult onset asthma 1 month ago started on Ventolin without relief comes in with 3 days of hemoptysis (none since day of admission 11/7), found to have CTA chest evidence of LLL mass. Underwent bronchoscopy this AM with plans for excision by CT surgery on Monday. ICU consulted for need for closer monitoring.    #LLL mass  Pt with newfound LLL mass seen initially several days ago on outpatient CTA chest and again seen on CT chest and CTA chest performed this admission. Pt without respiratory distress from the mass at this time. No further hemoptysis episodes since 3pm on 11/7. Pt remains hemodynamically stable and in no respiratory distress. Bronchoscopy performed earlier this AM. Type of mass unclear at this time, ?carcinoid. Awaiting excision of mass on Monday by CT surgery.  - monitor on tele  - CT surgery following, f/u recs  - heme/onc following, f/u recs  - Hb stable, maintain active T&S  - if further hemoptysis, obtain STAT CBC and assess for airway protection    #postobstructive pneumonia  Pt with "postobstructive mucoid impaction" seen on CT angio chest with leukocytosis with neutrophil predominance on 11/8.   - c/w IV CFTX/azithromycin    #adult onset asthma  Pt recently diagnosed 1 month ago and started on Flonase and Ventolin BID. As per history, unclear if pt truly asthmatic or if 2/2 bronchial mass.  - c/w jose luis PRN    Dispo: [facility] under Dr. Varela    Discussed with Dr. Varela and pulm fellow.

## 2018-11-09 NOTE — PROGRESS NOTE ADULT - PROBLEM SELECTOR PLAN 1
Pt with 3 days of shot full of hemoptysis accompanied by wheezing and productive cough, saw Dr. Ratliff on outpatient with CT illustrating LLL lesion, sent to ED for workup of Mass and hemoptysis. Patient endorses night sweats intermittent since BCG vaccine but denies any weight loss. Possible etiologies include AVM (hemoptysis) malignancy (patient former smoker 5cigs/day for 2 years although less likely young age/not heavy smoker/no fam HX, but mass presence.), vs vasculitis (hemoptysis, episode of malaise, cough, night sweats, but no other signs of epistaxis/ulcerations, hematuria and less likely with mass present).   - Hb stable, but will maintain active T&S in light of current episodes  - CT angio w/ IV contrast 11/8- pending official read  - Plan for bronchoscopy today11/9 for evaluation of mass  - Consider vasculitis/antibodies if negative for AVM   - If severe hemoptysis again, get stat CBC and if not protecting airway/ tachypneic page anesthesia stat for possible intubation   - Heme/Onc following

## 2018-11-09 NOTE — PROGRESS NOTE ADULT - SUBJECTIVE AND OBJECTIVE BOX
OVERNIGHT EVENTS:    SUBJECTIVE / INTERVAL HPI: Patient seen and examined at bedside.     VITAL SIGNS:  Vital Signs Last 24 Hrs  T(C): 36.4 (09 Nov 2018 05:58), Max: 37.1 (08 Nov 2018 20:20)  T(F): 97.5 (09 Nov 2018 05:58), Max: 98.7 (08 Nov 2018 20:20)  HR: 74 (09 Nov 2018 05:58) (74 - 83)  BP: 113/59 (09 Nov 2018 05:58) (100/63 - 113/59)  BP(mean): --  RR: 18 (09 Nov 2018 05:58) (17 - 18)  SpO2: 99% (09 Nov 2018 05:58) (97% - 99%)    PHYSICAL EXAM:    General: WDWN  HEENT: NC/AT; PERRL, anicteric sclera; MMM  Neck: supple  Cardiovascular: +S1/S2, RRR  Respiratory: CTA B/L; no W/R/R  Gastrointestinal: soft, NT/ND; +BSx4  Extremities: WWP; no edema, clubbing or cyanosis  Vascular: 2+ radial, DP/PT pulses B/L  Neurological: AAOx3; no focal deficits    MEDICATIONS:  MEDICATIONS  (STANDING):  azithromycin  IVPB 500 milliGRAM(s) IV Intermittent every 24 hours  cefTRIAXone   IVPB 1 Gram(s) IV Intermittent every 24 hours    MEDICATIONS  (PRN):  ALBUTerol/ipratropium for Nebulization 3 milliLiter(s) Nebulizer every 6 hours PRN Shortness of Breath and/or Wheezing      ALLERGIES:  Allergies    No Known Allergies    Intolerances        LABS:                        15.3   8.9   )-----------( 268      ( 09 Nov 2018 05:52 )             45.9     11-09    138  |  98  |  18  ----------------------------<  99  4.6   |  29  |  1.20    Ca    9.9      09 Nov 2018 05:52  Mg     2.2     11-08    TPro  8.3  /  Alb  4.9  /  TBili  0.5  /  DBili  x   /  AST  18  /  ALT  21  /  AlkPhos  55  11-07    PT/INR - ( 09 Nov 2018 05:52 )   PT: 12.0 sec;   INR: 1.06          PTT - ( 09 Nov 2018 05:52 )  PTT:26.2 sec    CAPILLARY BLOOD GLUCOSE          RADIOLOGY & ADDITIONAL TESTS: Reviewed. TRANSFER NOTE  HOSPITAL COURSE:   23yoM with PMH of Adult onset asthma (dx a month ago, Flonase, Ventolin BID), frequent respiratory infections (Bronchitis childhood, 2 bronchitis as young adult) who presents 11/7 for hemoptysis, mentioning since BCG vaccine June 9th, 3 days later he began having cough/malaise/subjective fever/night sweats that then seemed to subside, with residual wheezing and diagnosis of Asthma, but over the last week he began having night sweats/productive cough and over the last 2-3 days had shot glass full of hemoptysis s/p seeing Dr. Ratliff on outpatient with CT illustrating LLL lesion, sent to ED for workup of Mass and hemoptysis. At ED Vitals: T 98 (Max: 98.2), HR: 94 (92 - 94), BP: 124/75 (112/68 - 124/75), RR: 16, SpO2: 97% (94% - 97%) on RA. CT Chest No Cont showing multilobulated masslike structure in the left lower lobe superior segment measuring approximately 6.0 x 2.5 x 4.1 cm high. The lesion extends into the left lower lobe and the superior segmental bronchi. The left hilum is prominent. Dilated bronchi are seen in the superior segment of the left lower lobe. Labs significant Leukocytosis WBC 8.4, NT 88%. Received Ceftriaxone 1g IV x1, Azithromycin 500mg IV x1, Solumedrol 125mg IV x1. At [facility] patient remained with VSS, no more episodes of hemoptysis, wheezing or cough. 11/8 CT angio was done for evaluation of mass possibly AVM, pending official read. Patient was kept NPO overnight with no acute events, for bronchoscopy today. Patient was continued on treatment with CFTX/Azithro prophylactically for CAP. After bronchoscopy, plan is to transfer patient to [facility] for closer monitoring.     OVERNIGHT EVENTS: Pt went for CTangio chest yesterday pending read; NAEO or hemoptysis     SUBJECTIVE / INTERVAL HPI: Patient seen and examined at bedside. Pt mentions feeling well denies any wheezing, hemoptysis, or cough. Patient NPO overnight for Bronchoscopy this AM.     VITAL SIGNS:  Vital Signs Last 24 Hrs  T(C): 36.4 (09 Nov 2018 05:58), Max: 37.1 (08 Nov 2018 20:20)  T(F): 97.5 (09 Nov 2018 05:58), Max: 98.7 (08 Nov 2018 20:20)  HR: 74 (09 Nov 2018 05:58) (74 - 83)  BP: 113/59 (09 Nov 2018 05:58) (100/63 - 113/59)  BP(mean): --  RR: 18 (09 Nov 2018 05:58) (17 - 18)  SpO2: 99% (09 Nov 2018 05:58) (97% - 99%)    PHYSICAL EXAM:  General: young healthy appearing male in NAD   HEENT: NC/AT; PERRL, anicteric sclera; MMM  Neck: supple  Cardiovascular: +S1/S2, RRR  Respiratory: CTA B/L; no wheezing appreciated  Gastrointestinal: soft, NT/ND; +BSx4  Extremities: WWP; no edema, clubbing or cyanosis  Vascular: 2+ radial, DP/PT pulses B/L  Neurological: AAOx3; no focal deficits    MEDICATIONS:  MEDICATIONS  (STANDING):  azithromycin  IVPB 500 milliGRAM(s) IV Intermittent every 24 hours  cefTRIAXone   IVPB 1 Gram(s) IV Intermittent every 24 hours    MEDICATIONS  (PRN):  ALBUTerol/ipratropium for Nebulization 3 milliLiter(s) Nebulizer every 6 hours PRN Shortness of Breath and/or Wheezing      ALLERGIES:  Allergies    No Known Allergies    Intolerances        LABS:                        15.3   8.9   )-----------( 268      ( 09 Nov 2018 05:52 )             45.9     11-09    138  |  98  |  18  ----------------------------<  99  4.6   |  29  |  1.20    Ca    9.9      09 Nov 2018 05:52  Mg     2.2     11-08    TPro  8.3  /  Alb  4.9  /  TBili  0.5  /  DBili  x   /  AST  18  /  ALT  21  /  AlkPhos  55  11-07    PT/INR - ( 09 Nov 2018 05:52 )   PT: 12.0 sec;   INR: 1.06          PTT - ( 09 Nov 2018 05:52 )  PTT:26.2 sec    CAPILLARY BLOOD GLUCOSE          RADIOLOGY & ADDITIONAL TESTS: Reviewed.

## 2018-11-09 NOTE — CONSULT NOTE ADULT - SUBJECTIVE AND OBJECTIVE BOX
Surgeon: Dr. Harris/Dr. Veliz    Requesting Physician: Dr. Varela    HISTORY OF PRESENT ILLNESS:  22yo Male with PMHx of adult onset asthma (dx a month ago, Flonase, Ventolin BID), frequent respiratory infections, seasonal allergies presents to [facility] after being referred for evaluation by Dr. Ashwini Ratliff after outpatient CTA chest revealed a "LLL lesion" in the setting of 2-3 days of cough and hemoptysis. States he has been experiencing 2-3 days of a shot glass worth of hemoptysis (last today at 4pm with 2-3 napkins of streaked blood, approx. 5cc). Worse with lying flat. Improves with sitting up. States he has been wheezing and intermittently coughing (nonproductive) for the past 2 months. Was given Levaquin and Prednisone 40mg daily two days ago by Urgent Care. Endorses frequent respiratory infections, particularly as a child as well as seasonal allergies. Reports being treated with antibiotics previously for these infections (last one 2 years ago) with resolution of symptoms. Of note, had BCG vaccine in 6/2018 (green card process; patient emigrated from [place] in [id number]) and said started not feeling well weeks after vaccine. Denies prior PPD or QF-gold testing. No h/o TB contacts. Endorses recent sick contact w/ relative's infant with viral illness and friend's partner. Has traveled to [facility] in past 6-12mos. 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 States he traveled on flights almost 150 times this past year and lives a very busy life. Also endorsed hiking in [place] few weeks ago but denies any exposures or going into caves. Former smoker [id number], 5 cigarettes a day. No RDU. Currently, denies fevers, chills, CP, SOB, abdominal pain, dysphagia, N/V/D, night sweats, urinary symptoms, lower extremity swelling, recent weight loss.     In the ED, VS were T(F): 98 (Max: 98.2), HR: 94 (92 - 94), BP: 124/75 (112/68 - 124/75), RR: 16, SpO2: 97% (94% - 97%) on RA. CT Chest No Cont showing multilobulated masslike structure in the left lower lobe superior segment measuring approximately 6.0 x 2.5 x 4.1 cm high. The lesion extends into the left lower lobe and the superior segmental bronchi. The left hilum is prominent. Dilated bronchi are seen in the superior segment of the left lower lobe. Labs significant for WBC 8.4, NT 88%. Received Ceftriaxone 1g IV x1, Azithromycin 500mg IV x1, Solumedrol 125mg IV x1, Patient admitted for evaluation of hemoptysis and new lung mass found on CT.     CTSx addendum:   Agree with above as discussed with patient.   On 11/9/18 patient underwent Flexible Bronchoscopy, with Dr. Varela. There was a large almost completely obstructing lobular appearing endobronchial lesions in the left lower lobe.     Thoracic Surgery, Dr. Harris/ Dr. Veliz, consulted for surgical evaluation of endobronchial lesion.     PAST MEDICAL & SURGICAL HISTORY:  Seasonal allergies  Asthma  No significant past surgical history      MEDICATIONS  (STANDING):  azithromycin  IVPB 500 milliGRAM(s) IV Intermittent every 24 hours  cefTRIAXone   IVPB 1 Gram(s) IV Intermittent every 24 hours    MEDICATIONS  (PRN):  ALBUTerol/ipratropium for Nebulization 3 milliLiter(s) Nebulizer every 6 hours PRN Shortness of Breath and/or Wheezing      Allergies    No Known Allergies    Intolerances        SOCIAL HISTORY:  Smoker:  YES         PACK YEARS:  [id number], 5 cigarettes per day              WHEN QUIT? 2016  ETOH use:  NO              Ilicit Drug use:  NO  Occupation: E-commerce  Assisted device use (Cane / Walker): None  Live with:     FAMILY HISTORY:  No pertinent family history in first degree relatives      Review of Systems  CONSTITUTIONAL:  +Hot flashes, night sweats, denies Fevers, chills, fatigue, weight loss, weight gain               POSITIVE  NEURO:  parathesias, seizures, syncope, confusion                                                                               NEGATIVE  EYES:  Blurry vision, discharge, pain, loss of vision                                                                                  NEGATIVE  ENMT:  Difficulty hearing, vertigo, dysphagia, epistaxis, recent dental work                                     NEGATIVE  CV:  +Chest pain, denies palpitations, YANCEY, orthopnea                                                                  POSITIVE  RESPIRATORY:  +Wheezing, cough, hemoptysis                                                                            POSITIVE  GI:  Nausea, vomiting, diarrhea, constipation, melena                                                                        NEGATIVE  : Hematuria, dysuria, urgency, incontinence                                                                                       NEGATIVE  MUSKULOSKELETAL:  arthritis, joint swelling, muscle weakness                                                           NEGATIVE  SKIN/BREAST:  rash, itching, valeriano loss, masses                                                                                            NEGATIVE  PSYCH:  depresion, anxiety, suicidal ideation                                                                                             NEGATIVE  HEME/LYMPH:  bruises easily, enlarged lymph nodes, tender lymph nodes                                        NEGATIVE  ENDOCRINE:  cold intolerance, heat intolerance, polydipsia                                                                   NEGATIVE    PHYSICAL EXAM  Vital Signs Last 24 Hrs  T(C): 36.8 (09 Nov 2018 14:33), Max: 36.8 (09 Nov 2018 14:33)  T(F): 98.2 (09 Nov 2018 14:33), Max: 98.2 (09 Nov 2018 14:33)  HR: 79 (09 Nov 2018 14:33) (74 - 79)  BP: 133/74 (09 Nov 2018 14:33) (113/59 - 133/74)  BP(mean): --  RR: 17 (09 Nov 2018 14:33) (17 - 18)  SpO2: 96% (09 Nov 2018 14:33) (96% - 99%)    CONSTITUTIONAL: Well-nourished, appears stated age, normal affect, male lying comfortably in bed, anxious about medical course.   NEURO: CN II-XII grossly intact, A&Ox3, no focal deficits.                 EYES: PERRLA, EOMI, no conjunctival injection  ENMT: Moist mucous membranes, no erythema, trachea midline.   CV: S1S2, RRR, no murmurs appreciated on exam.   RESPIRATORY: Left lower expiratory wheeze, otherwise clear to auscultation bilaterally, no rales or rhonchi.   GI: Abdomen soft, non tender, non distended, +bowel sounds.   : Deferred  MUSKULOSKELETAL: 5/5 strength b/l, good range of motion in all extremities, no swollen or erythematous joints.   SKIN / BREAST: No rashes or lesions.   VASCULAR: DP/PT pulses 2+ b/l, Radial 2+b/l. Femoral 2+ b/l.                                                              LABS:                        15.3   8.9   )-----------( 268      ( 09 Nov 2018 05:52 )             45.9     11-09    138  |  98  |  18  ----------------------------<  99  4.6   |  29  |  1.20    Ca    9.9      09 Nov 2018 05:52  Mg     2.2     11-08      PT/INR - ( 09 Nov 2018 05:52 )   PT: 12.0 sec;   INR: 1.06          PTT - ( 09 Nov 2018 05:52 )  PTT:26.2 sec            RADIOLOGY & ADDITIONAL STUDIES:  CAROTID U/S: none    CXR: pending    CT Scan: < from: CT Angio Chest w/ IV Cont (11.08.18 @ 18:14) >    IMPRESSION: Enhancing left lower lobe lesion with endobronchial component   and postobstructive bronchiolar mucoid impaction. Carcinoid is in the   differential. No vascular malformation.    < end of copied text >    < from: CT Chest No Cont (11.07.18 @ 19:01) >  IMPRESSION:   Limited study due to lack of IV contrast.    Multilobulated mass in the left lower lobe with a small component   extending into the left lower lobe bronchus as noted above. The tubular   multilobulated appearance suggest a pulmonary arteriovenous malformation,   however neoplasm cannot be excluded. Recommend CT angiogram of the   pulmonary arteries to include arterial phase and portal venous phase   images.    < end of copied text >    EKG: < from: 12 Lead ECG (11.08.18 @ 04:49) >  Diagnosis Line Normal sinus rhythm  Possible Lateral infarct , age undetermined    < end of copied text >    TTE / DEE:  none     Cardiac Cath: none

## 2018-11-09 NOTE — CONSULT NOTE ADULT - ASSESSMENT
24yo Male with PMHx of adult onset asthma (dx a month ago, Flonase, Ventolin BID), frequent respiratory infections, seasonal allergies presents to [facility] after being referred for evaluation by Dr. Ashwini Ratliff after outpatient CTA chest revealed a "LLL lesion" in the setting of 2-3 days of cough and hemoptysis. CT Chest No Cont showing multilobulated masslike structure in the left lower lobe superior segment measuring approximately 6.0 x 2.5 x 4.1 cm high. The lesion extends into the left lower lobe and the superior segmental bronchi. The left hilum is prominent. Dilated bronchi are seen in the superior segment of the left lower lobe. Patient admitted for evaluation of hemoptysis and new lung mass found on CT. On 11/9/18 patient underwent Flexible Bronchoscopy, with Dr. Varela. There was a large almost completely obstructing lobular appearing endobronchial lesions in the left lower lobe. Thoracic Surgery, Dr. Harris/ Dr. Veliz, consulted for surgical evaluation and plan is for patient to undergo bronch and laser for endobronchial lesion.     Plan:  - Case discussed with Dr. Harris/ Dr. Veliz  - CT chest and CTA chest performed during this admission shows left lower lobe lesion with endobronchial component and postobstructive bronchiolar mucoid impaction. Carcinoid is in the differential.  - S/p Flexible Bronchoscopy today 11/9/18 with Dr. Varela. There was a large almost completely obstructing lobular appearing endobronchial lesion in the left lower lobe.   - Plan is for bronch and laser excision on Monday 11/12/18.  NPO p MN on Sunday night 11/11/18.  - Continue care per primary team. Continue to monitor respiratory status. No hemoptysis episodes since 11/7/18.   -  Heme/Onc following, recs appreciated.  - Will continue to follow.

## 2018-11-09 NOTE — PROGRESS NOTE ADULT - PROBLEM SELECTOR PLAN 4
Recently diagnosed with wheezing on outpatient; currently on outpatient Ventolin/ Fluticasone;   - C/w duonebs if needed

## 2018-11-09 NOTE — PROGRESS NOTE ADULT - SUBJECTIVE AND OBJECTIVE BOX
Heme/Onc Progress Note (Dr. Ratliff )    Interval History: Patient s/p bronch in Am, and planned for excision of mass monday 11/12 per CTS. Patient otherwise remains clinically stable at this time, no recurrent hemoptysis, acute sob, chest pain, YANCEY, syncope, nausea, vomiting, bowel changes, fevers or chills.     ROS is otherwise negative.      Allergies    No Known Allergies  Medications:  MEDICATIONS  (STANDING):  azithromycin  IVPB 500 milliGRAM(s) IV Intermittent every 24 hours  cefTRIAXone   IVPB 1 Gram(s) IV Intermittent every 24 hours    MEDICATIONS  (PRN):  ALBUTerol/ipratropium for Nebulization 3 milliLiter(s) Nebulizer every 6 hours PRN Shortness of Breath and/or Wheezing    PHYSICAL EXAM:    T(F): 98.2 (11-09-18 @ 14:33), Max: 98.7 (11-08-18 @ 20:20)  HR: 79 (11-09-18 @ 14:33) (74 - 81)  BP: 133/74 (11-09-18 @ 14:33) (100/63 - 133/74)  RR: 17 (11-09-18 @ 14:33) (17 - 18)  SpO2: 96% (11-09-18 @ 14:33) (96% - 99%)  Wt(kg): --    Daily     Daily     Gen: well developed, well nourished,  HEENT: normocephalic/atraumatic, no oral thrush/mucosal bleeding/mucositis  Neck: supple, no adenopathy   Cardiovascular: RR, nl S1S2  Respiratory: rhonchi LLL, good inspiratory effort   Gastrointestinal: BS+, soft, NT/ND  Extremities: no clubbing/cyanosis, no edema, no calf tenderness  Neurological: no focal deficits  Skin: no rash on visible skin  Musculoskeletal:  full ROM    Labs:                          15.3   8.9   )-----------( 268      ( 09 Nov 2018 05:52 )             45.9   CBC Full  -  ( 09 Nov 2018 05:52 )  WBC Count : 8.9 K/uL  Hemoglobin : 15.3 g/dL  Hematocrit : 45.9 %  Platelet Count - Automated : 268 K/uL  Mean Cell Volume : 91.6 fL  Mean Cell Hemoglobin : 30.5 pg  Mean Cell Hemoglobin Concentration : 33.3 g/dL  Auto Neutrophil # : x  Auto Lymphocyte # : x  Auto Monocyte # : x  Auto Eosinophil # : x  Auto Basophil # : x  Auto Neutrophil % : x  Auto Lymphocyte % : x  Auto Monocyte % : x  Auto Eosinophil % : x  Auto Basophil % : x    PT/INR - ( 09 Nov 2018 05:52 )   PT: 12.0 sec;   INR: 1.06          PTT - ( 09 Nov 2018 05:52 )  PTT:26.2 sec    11-09    138  |  98  |  18  ----------------------------<  99  4.6   |  29  |  1.20    Ca    9.9      09 Nov 2018 05:52  Mg     2.2     11-08  Bronchoscopy note:   The airway was suctioned and there was a large almost completely obstructing lobular appearing endobronchial lesion in the left lower lobe right by the take off point. The was no clear stalk identified. The bleeding was eminating from around this endobronchial lesion. The area was suctioned clean and the bleeding was self limiting. T  CTA Chest     CHEST WALL AND LOWER NECK: Within normal limits  MEDIASTINUM AND LEXI: Within normal limits  HEART: Within normal limits  VESSELS: Within normal limits  LUNG AND LARGE AIRWAYS: 3.2 x 2.7 cm homogeneously and avidly enhancing   lobulated left lower lobe mass with endobronchial component on 6:45.   Multiple fluid-filled rounded and tubular cystic foci, probably mucoid   impacted bronchioles. Several hypertrophied bronchiolar arteries in the   vicinity. No evidence of vascular malformation.  VISUALIZED UPPER ABDOMEN: Within normal limits.  BONES: Within normal limits.    IMPRESSION: Enhancing left lower lobe lesion with endobronchial component   and postobstructive bronchiolar mucoid impaction. Carcinoid is in the   differential. No vascular malformation.

## 2018-11-09 NOTE — CONSULT NOTE ADULT - SUBJECTIVE AND OBJECTIVE BOX
ICU CONSULT NOTE    CHIEF COMPLAINT: Patient is a 23y old  Male who presents with a chief complaint of Hemoptysis (09 Nov 2018 08:45)      HPI:    23M with no significant PMH prior to 1 month ago, frequent respiratory infections and seasonal allergies (in childhood and again, more recently) presenting to [facility] after visiting Dr. Ratliff with outpatient CTA chest showing LLL lesion. Pt had been having coughing with concurrent hemoptysis productive of 3 days of "shot-glasses" of blood. Pt had been wheezing with intermittent nonproductive coughing, which prompted him to visit a doctor who diagnosed him with adult onset asthma and prescribed him Flonase and Ventolin BID, which pt states has not been helping him. He was also prescribed Levaquin and prednisone 40mg daily at an urgent care 2 days ago. Flies domestically frequently due ot pt's occupation. Routinely hikes and runs marathons without experiencing SOB. Former 5 cig/day smoker from 2012 to 2016. Denies f/c, chest pain, SOB, abdominal pain, n/v/d/c, dysuria, rashes.     Pt was initially on the regional medical floor and was started on IV CFTX/azithromycin for post-obstructive pneumonia, remained hemodynamically stable, without further episodes of hemoptysis. Pt went for bronchoscopy this AM with limited visualization but mass deemed less likely AVM. CT surgery to remove lesion on Monday and to further characterize nature of mass. ICU consulted for need for closer monitoring in setting of bronchial mass.    Vitals: T 97.5-98.7F, HR 74-91, -113/59-70, RR 16-18, O2 95-99 on RA  CT Chest noncontrast: multilobulated mass in LLL superior segment ([id number]) with prominent left hilum.     FHx: 1st cousin with "lung mass" and Kawasaki disease, mother with hypothyroidism; paternal grandparents with diabetes  Social Hx: former smoker 1 pack-year (quit 2016); occasional EtOH use; no other recreational drug use      PAST MEDICAL & SURGICAL HISTORY:  Seasonal allergies  Asthma  No significant past surgical history      REVIEW OF SYSTEMS: negative except as stated in HPI.    MEDICATIONS  (STANDING):  azithromycin  IVPB 500 milliGRAM(s) IV Intermittent every 24 hours  cefTRIAXone   IVPB 1 Gram(s) IV Intermittent every 24 hours    MEDICATIONS  (PRN):  ALBUTerol/ipratropium for Nebulization 3 milliLiter(s) Nebulizer every 6 hours PRN Shortness of Breath and/or Wheezing      Allergies    No Known Allergies    Intolerances        FAMILY HISTORY:  No pertinent family history in first degree relatives      SOCIAL HISTORY:     Vital Signs Last 24 Hrs  T(C): 36.4 (09 Nov 2018 05:58), Max: 37.1 (08 Nov 2018 20:20)  T(F): 97.5 (09 Nov 2018 05:58), Max: 98.7 (08 Nov 2018 20:20)  HR: 74 (09 Nov 2018 05:58) (74 - 83)  BP: 113/59 (09 Nov 2018 05:58) (100/63 - 113/59)  BP(mean): --  RR: 18 (09 Nov 2018 05:58) (17 - 18)  SpO2: 99% (09 Nov 2018 05:58) (97% - 99%)    PHYSICAL EXAM:  GEN: pleasant young male, in no apparent distress  HEENT: PERRL, no relative afferent pupillary defect, EOMI, moist MM, no pharyngeal erythema or exudate  CV: RRR, S1/S2, no murmurs appreciated  RESP: rhonchi worse at LLL but audible throughout L lobe; coughing with deep inspiration; good respiratory effort, mild wheezing throughout  ABD: soft, BS+, nontender, nondistended, no guarding/rebound  EXTREMITIES: WWP, pulses 2+ in all 4 extremities, no peripheral edema  MSK: full range of motion of all 4 extremities  SKIN: warm, dry, intact, no rashes  NEURO: A&Ox3, no focal deficits, strength 5/5 throughout, no sensory deficits  PSYC: normal mood/affect    LABS: reviewed.    CAPILLARY BLOOD GLUCOSE                              15.3   8.9   )-----------( 268      ( 09 Nov 2018 05:52 )             45.9     11-09    138  |  98  |  18  ----------------------------<  99  4.6   |  29  |  1.20    Ca    9.9      09 Nov 2018 05:52  Mg     2.2     11-08    TPro  8.3  /  Alb  4.9  /  TBili  0.5  /  DBili  x   /  AST  18  /  ALT  21  /  AlkPhos  55  11-07    PT/INR - ( 09 Nov 2018 05:52 )   PT: 12.0 sec;   INR: 1.06          PTT - ( 09 Nov 2018 05:52 )  PTT:26.2 sec  LIVER FUNCTIONS - ( 07 Nov 2018 17:31 )  Alb: 4.9 g/dL / Pro: 8.3 g/dL / ALK PHOS: 55 U/L / ALT: 21 U/L / AST: 18 U/L / GGT: x                       RADIOLOGY AND ADDITIONAL TESTS: reviewed.    CT Chest No Cont (11.07.18 @ 19:01)  IMPRESSION:   Limited study due to lack of IV contrast.    Multilobulated mass in the left lower lobe with a small component   extending into the left lower lobe bronchus as noted above. The tubular   multilobulated appearance suggest a pulmonary arteriovenous malformation,   however neoplasm cannot be excluded. Recommend CT angiogram of the   pulmonary arteries to include arterial phase and portal venous phase   images.    CT Angio Chest w/ IV Cont (11.08.18 @ 18:14)   Enhancing left lower lobe lesion with endobronchial component   and postobstructive bronchiolar mucoid impaction. Carcinoid is in the   differential. No vascular malformation. ICU CONSULT NOTE    CHIEF COMPLAINT: Patient is a 23y old  Male who presents with a chief complaint of Hemoptysis (09 Nov 2018 08:45)      HPI:    23M with no significant PMH prior to 1 month ago, frequent respiratory infections and seasonal allergies (in childhood and again, more recently) presenting to [facility] after visiting Dr. Ratliff with outpatient CTA chest showing LLL lesion. Pt had been having coughing with concurrent hemoptysis productive of 3 days of "shot-glasses" of blood. Pt had been wheezing with intermittent nonproductive coughing, which prompted him to visit a doctor who diagnosed him with adult onset asthma and prescribed him Flonase and Ventolin BID, which pt states has not been helping him. He was also prescribed Levaquin and prednisone 40mg daily at an urgent care 2 days ago. Flies domestically frequently due ot pt's occupation. Routinely hikes and runs marathons without experiencing SOB. Former 5 cig/day smoker from 2012 to 2016. Denies f/c, chest pain, SOB, abdominal pain, n/v/d/c, dysuria, rashes.     Pt was initially on the regional medical floor and was started on IV CFTX/azithromycin for post-obstructive pneumonia, remained hemodynamically stable, without further episodes of hemoptysis. Pt went for bronchoscopy this AM with cultures taken; mass deemed less likely AVM. CTA chest read as possibly carcinoid. CT surgery to remove lesion on Monday and to further characterize nature of mass. ICU consulted for need for closer monitoring in setting of bronchial mass.    Vitals: T 97.5-98.7F, HR 74-91, -113/59-70, RR 16-18, O2 95-99 on RA  CT Chest noncontrast: multilobulated mass in LLL superior segment ([id number]) with prominent left hilum.     FHx: 1st cousin with "lung mass" and Kawasaki disease, mother with hypothyroidism; paternal grandparents with diabetes  Social Hx: former smoker 1 pack-year (quit 2016); occasional EtOH use; no other recreational drug use      PAST MEDICAL & SURGICAL HISTORY:  Seasonal allergies  Asthma  No significant past surgical history      REVIEW OF SYSTEMS: negative except as stated in HPI.    MEDICATIONS  (STANDING):  azithromycin  IVPB 500 milliGRAM(s) IV Intermittent every 24 hours  cefTRIAXone   IVPB 1 Gram(s) IV Intermittent every 24 hours    MEDICATIONS  (PRN):  ALBUTerol/ipratropium for Nebulization 3 milliLiter(s) Nebulizer every 6 hours PRN Shortness of Breath and/or Wheezing      Allergies    No Known Allergies    Intolerances        FAMILY HISTORY:  No pertinent family history in first degree relatives      SOCIAL HISTORY:     Vital Signs Last 24 Hrs  T(C): 36.4 (09 Nov 2018 05:58), Max: 37.1 (08 Nov 2018 20:20)  T(F): 97.5 (09 Nov 2018 05:58), Max: 98.7 (08 Nov 2018 20:20)  HR: 74 (09 Nov 2018 05:58) (74 - 83)  BP: 113/59 (09 Nov 2018 05:58) (100/63 - 113/59)  BP(mean): --  RR: 18 (09 Nov 2018 05:58) (17 - 18)  SpO2: 99% (09 Nov 2018 05:58) (97% - 99%)    PHYSICAL EXAM:  GEN: pleasant young male, in no apparent distress  HEENT: PERRL, no relative afferent pupillary defect, EOMI, moist MM, no pharyngeal erythema or exudate  CV: RRR, S1/S2, no murmurs appreciated  RESP: rhonchi worse at LLL but audible throughout L lobe; coughing with deep inspiration; good respiratory effort, mild wheezing throughout  ABD: soft, BS+, nontender, nondistended, no guarding/rebound  EXTREMITIES: WWP, pulses 2+ in all 4 extremities, no peripheral edema  MSK: full range of motion of all 4 extremities  SKIN: warm, dry, intact, no rashes  NEURO: A&Ox3, no focal deficits, strength 5/5 throughout, no sensory deficits  PSYC: normal mood/affect    LABS: reviewed.    CAPILLARY BLOOD GLUCOSE                              15.3   8.9   )-----------( 268      ( 09 Nov 2018 05:52 )             45.9     11-09    138  |  98  |  18  ----------------------------<  99  4.6   |  29  |  1.20    Ca    9.9      09 Nov 2018 05:52  Mg     2.2     11-08    TPro  8.3  /  Alb  4.9  /  TBili  0.5  /  DBili  x   /  AST  18  /  ALT  21  /  AlkPhos  55  11-07    PT/INR - ( 09 Nov 2018 05:52 )   PT: 12.0 sec;   INR: 1.06          PTT - ( 09 Nov 2018 05:52 )  PTT:26.2 sec  LIVER FUNCTIONS - ( 07 Nov 2018 17:31 )  Alb: 4.9 g/dL / Pro: 8.3 g/dL / ALK PHOS: 55 U/L / ALT: 21 U/L / AST: 18 U/L / GGT: x                       RADIOLOGY AND ADDITIONAL TESTS: reviewed.    CT Chest No Cont (11.07.18 @ 19:01)  IMPRESSION:   Limited study due to lack of IV contrast.    Multilobulated mass in the left lower lobe with a small component   extending into the left lower lobe bronchus as noted above. The tubular   multilobulated appearance suggest a pulmonary arteriovenous malformation,   however neoplasm cannot be excluded. Recommend CT angiogram of the   pulmonary arteries to include arterial phase and portal venous phase   images.    CT Angio Chest w/ IV Cont (11.08.18 @ 18:14)   Enhancing left lower lobe lesion with endobronchial component   and postobstructive bronchiolar mucoid impaction. Carcinoid is in the   differential. No vascular malformation.

## 2018-11-10 LAB
HCT VFR BLD CALC: 44.4 % — SIGNIFICANT CHANGE UP (ref 39–50)
HGB BLD-MCNC: 14.9 G/DL — SIGNIFICANT CHANGE UP (ref 13–17)
MCHC RBC-ENTMCNC: 30.7 PG — SIGNIFICANT CHANGE UP (ref 27–34)
MCHC RBC-ENTMCNC: 33.6 G/DL — SIGNIFICANT CHANGE UP (ref 32–36)
MCV RBC AUTO: 91.5 FL — SIGNIFICANT CHANGE UP (ref 80–100)
NIGHT BLUE STAIN TISS: SIGNIFICANT CHANGE UP
PLATELET # BLD AUTO: 275 K/UL — SIGNIFICANT CHANGE UP (ref 150–400)
RBC # BLD: 4.85 M/UL — SIGNIFICANT CHANGE UP (ref 4.2–5.8)
RBC # FLD: 13.1 % — SIGNIFICANT CHANGE UP (ref 10.3–16.9)
SPECIMEN SOURCE: SIGNIFICANT CHANGE UP
WBC # BLD: 7.8 K/UL — SIGNIFICANT CHANGE UP (ref 3.8–10.5)
WBC # FLD AUTO: 7.8 K/UL — SIGNIFICANT CHANGE UP (ref 3.8–10.5)

## 2018-11-10 PROCEDURE — 99232 SBSQ HOSP IP/OBS MODERATE 35: CPT | Mod: GC

## 2018-11-10 RX ADMIN — CEFTRIAXONE 100 GRAM(S): 500 INJECTION, POWDER, FOR SOLUTION INTRAMUSCULAR; INTRAVENOUS at 17:02

## 2018-11-10 RX ADMIN — AZITHROMYCIN 255 MILLIGRAM(S): 500 TABLET, FILM COATED ORAL at 17:40

## 2018-11-10 NOTE — PROGRESS NOTE ADULT - PROBLEM SELECTOR PLAN 1
Pt with 3 days of shot full of hemoptysis accompanied by wheezing and productive cough, saw Dr. Ratliff on outpatient with CT illustrating LLL lesion, sent to ED for workup of Mass and hemoptysis. Patient endorses night sweats intermittent since BCG vaccine but denies any weight loss. Possible etiologies include AVM (hemoptysis) malignancy (patient former smoker 5cigs/day for 2 years although less likely young age/not heavy smoker/no fam HX, but mass presence.), vs vasculitis (hemoptysis, episode of malaise, cough, night sweats, but no other signs of epistaxis/ulcerations, hematuria and less likely with mass present).   - Hb stable, but will maintain active T&S in light of current episodes  - CT angio w/ IV contrast 11/8- pending official read  - Plan for bronchoscopy today11/9 for evaluation of mass  - Consider vasculitis/antibodies if negative for AVM   - If severe hemoptysis again, get stat CBC and if not protecting airway/ tachypneic page anesthesia stat for possible intubation   - Heme/Onc following Pt with 3 days of shot full of hemoptysis accompanied by wheezing and productive cough, saw Dr. Ratliff on outpatient with CT illustrating LLL lesion, sent to ED for workup of Mass and hemoptysis. Patient endorses night sweats intermittent since BCG vaccine but denies any weight loss. Possible etiologies include AVM (hemoptysis) malignancy (patient former smoker 5cigs/day for 2 years although less likely young age/not heavy smoker/no fam HX, but mass presence.), vs vasculitis (hemoptysis, episode of malaise, cough, night sweats, but no other signs of epistaxis/ulcerations, hematuria and less likely with mass present). s/p bronch 11/9/18 with Dr. Varela - obstructing lobular endobronchial lesion in LLL   - Hb stable, but will maintain active T&S in light of current episodes  - CT angio w/ IV contrast 11/8- enhancing LLL w/ endobronchial involvement, no AVM.  - Consider vasculitis/antibodies if negative for AVM   - If severe hemoptysis again, get stat CBC and if not protecting airway/ tachypneic page anesthesia stat for possible intubation   - Heme/Onc following  - CT surg following, plans for bronch and laser excision on 11/12, will be NPO after MN on Sunday night

## 2018-11-10 NOTE — PROGRESS NOTE ADULT - PROBLEM SELECTOR PLAN 5
F: None tolerating PO   E: Replete for K<4 Mag<2  N: NPO  for Bronchoscopy   A: None Improve 0, and with hemoptysis; SCD F: None tolerating PO   E: Replete for K<4 Mag<2  N: Regular diet  A: None Improve 0, and with hemoptysis; SCD

## 2018-11-10 NOTE — PROGRESS NOTE ADULT - SUBJECTIVE AND OBJECTIVE BOX
Events noted and Dr Carmen Gonzales's note appreciated.     The patient is stable and wife and pt. understand the reasons for him to stay in hospital.    Continue the ABs and pulmonary measures.

## 2018-11-10 NOTE — PROGRESS NOTE ADULT - SUBJECTIVE AND OBJECTIVE BOX
OVERNIGHT EVENTS:    SUBJECTIVE / INTERVAL HPI: Patient seen and examined at bedside.     VITAL SIGNS:  Vital Signs Last 24 Hrs  T(C): 36.6 (10 Nov 2018 05:12), Max: 36.8 (09 Nov 2018 14:33)  T(F): 97.9 (10 Nov 2018 05:12), Max: 98.2 (09 Nov 2018 14:33)  HR: 61 (10 Nov 2018 05:12) (61 - 81)  BP: 100/67 (10 Nov 2018 05:12) (100/67 - 133/74)  BP(mean): --  RR: 17 (10 Nov 2018 05:12) (16 - 17)  SpO2: 95% (10 Nov 2018 05:12) (95% - 96%)    PHYSICAL EXAM:    General: WDWN  HEENT: NC/AT; PERRL, anicteric sclera; MMM  Neck: supple  Cardiovascular: +S1/S2, RRR  Respiratory: CTA B/L; no W/R/R  Gastrointestinal: soft, NT/ND; +BSx4  Extremities: WWP; no edema, clubbing or cyanosis  Vascular: 2+ radial, DP/PT pulses B/L  Neurological: AAOx3; no focal deficits    MEDICATIONS:  MEDICATIONS  (STANDING):  azithromycin  IVPB 500 milliGRAM(s) IV Intermittent every 24 hours  cefTRIAXone   IVPB 1 Gram(s) IV Intermittent every 24 hours    MEDICATIONS  (PRN):  ALBUTerol/ipratropium for Nebulization 3 milliLiter(s) Nebulizer every 6 hours PRN Shortness of Breath and/or Wheezing      ALLERGIES:  Allergies    No Known Allergies    Intolerances        LABS:                        15.3   8.9   )-----------( 268      ( 09 Nov 2018 05:52 )             45.9     11-09    138  |  98  |  18  ----------------------------<  99  4.6   |  29  |  1.20    Ca    9.9      09 Nov 2018 05:52  Mg     2.2     11-08      PT/INR - ( 09 Nov 2018 05:52 )   PT: 12.0 sec;   INR: 1.06          PTT - ( 09 Nov 2018 05:52 )  PTT:26.2 sec    CAPILLARY BLOOD GLUCOSE          RADIOLOGY & ADDITIONAL TESTS: Reviewed. OVERNIGHT EVENTS: No acute overnight events. No hemoptysis     SUBJECTIVE / INTERVAL HPI: Patient seen and examined at bedside. He feels well, reports mild wheezing this morning that resolved, but denies cough, hemoptysis, fevers, chills, chest pain, shortness of breath, or any other complaints at this time. Tolerating diet without n/v.     VITAL SIGNS:  Vital Signs Last 24 Hrs  T(C): 36.6 (10 Nov 2018 05:12), Max: 36.8 (09 Nov 2018 14:33)  T(F): 97.9 (10 Nov 2018 05:12), Max: 98.2 (09 Nov 2018 14:33)  HR: 61 (10 Nov 2018 05:12) (61 - 81)  BP: 100/67 (10 Nov 2018 05:12) (100/67 - 133/74)  BP(mean): --  RR: 17 (10 Nov 2018 05:12) (16 - 17)  SpO2: 95% (10 Nov 2018 05:12) (95% - 96%)    PHYSICAL EXAM:    General: WDWN  HEENT: NC/AT; PERRL, anicteric sclera; MMM  Neck: supple  Cardiovascular: +S1/S2, RRR  Respiratory: CTA B/L; no W/R/R  Gastrointestinal: soft, NT/ND; +BSx4  Extremities: WWP; no edema, clubbing or cyanosis  Vascular: 2+ radial, DP/PT pulses B/L  Neurological: AAOx3; no focal deficits    MEDICATIONS:  MEDICATIONS  (STANDING):  azithromycin  IVPB 500 milliGRAM(s) IV Intermittent every 24 hours  cefTRIAXone   IVPB 1 Gram(s) IV Intermittent every 24 hours    MEDICATIONS  (PRN):  ALBUTerol/ipratropium for Nebulization 3 milliLiter(s) Nebulizer every 6 hours PRN Shortness of Breath and/or Wheezing      ALLERGIES:  Allergies    No Known Allergies    Intolerances        LABS:                        15.3   8.9   )-----------( 268      ( 09 Nov 2018 05:52 )             45.9     11-09    138  |  98  |  18  ----------------------------<  99  4.6   |  29  |  1.20    Ca    9.9      09 Nov 2018 05:52  Mg     2.2     11-08      PT/INR - ( 09 Nov 2018 05:52 )   PT: 12.0 sec;   INR: 1.06          PTT - ( 09 Nov 2018 05:52 )  PTT:26.2 sec    CAPILLARY BLOOD GLUCOSE          RADIOLOGY & ADDITIONAL TESTS: Reviewed.

## 2018-11-11 LAB — GALACTOMANNAN AG SPEC IA-ACNC: <0.5 INDEX — SIGNIFICANT CHANGE UP

## 2018-11-11 PROCEDURE — 71045 X-RAY EXAM CHEST 1 VIEW: CPT | Mod: 26

## 2018-11-11 PROCEDURE — 99223 1ST HOSP IP/OBS HIGH 75: CPT

## 2018-11-11 RX ORDER — CHLORHEXIDINE GLUCONATE 213 G/1000ML
1 SOLUTION TOPICAL ONCE
Qty: 0 | Refills: 0 | Status: DISCONTINUED | OUTPATIENT
Start: 2018-11-11 | End: 2018-11-11

## 2018-11-11 RX ORDER — CHLORHEXIDINE GLUCONATE 213 G/1000ML
10 SOLUTION TOPICAL ONCE
Qty: 0 | Refills: 0 | Status: COMPLETED | OUTPATIENT
Start: 2018-11-11 | End: 2018-11-12

## 2018-11-11 RX ORDER — CHLORHEXIDINE GLUCONATE 213 G/1000ML
1 SOLUTION TOPICAL ONCE
Qty: 0 | Refills: 0 | Status: COMPLETED | OUTPATIENT
Start: 2018-11-12 | End: 2018-11-12

## 2018-11-11 RX ORDER — CHLORHEXIDINE GLUCONATE 213 G/1000ML
1 SOLUTION TOPICAL ONCE
Qty: 0 | Refills: 0 | Status: COMPLETED | OUTPATIENT
Start: 2018-11-11 | End: 2018-11-11

## 2018-11-11 RX ADMIN — AZITHROMYCIN 255 MILLIGRAM(S): 500 TABLET, FILM COATED ORAL at 18:40

## 2018-11-11 RX ADMIN — CHLORHEXIDINE GLUCONATE 1 APPLICATION(S): 213 SOLUTION TOPICAL at 21:14

## 2018-11-11 RX ADMIN — CEFTRIAXONE 100 GRAM(S): 500 INJECTION, POWDER, FOR SOLUTION INTRAMUSCULAR; INTRAVENOUS at 17:20

## 2018-11-11 NOTE — PROGRESS NOTE ADULT - SUBJECTIVE AND OBJECTIVE BOX
Planned Date of Surgery:  11/11/18                                                                                                           Surgeon: Dr. Harris/ Dr. Veliz    Procedure: Flexible bronchoscopy, possible rigid laser biopsy      HPI:  A 24yo Male with PMH of adult onset asthma (dx a month ago, Flonase, Ventolin BID), frequent respiratory infections, seasonal allergies presents to [facility] after being referred for evaluation by Dr. Ashwini Ratliff after outpatient CTA chest revealed a "LLL lesion" in the setting of 2-3 days of cough and hemoptysis. States he has been experiencing 2-3 days of a shot glass worth of hemoptysis (last today at 4pm with 2-3 napkins of streaked blood, approx 5cc). Worse with lying flat. Improves with sitting up. States he has been wheezing and intermittently coughing (nonproductive) for the past 2 months. Was given Levaquin and Prednisone 40mg daily two days ago by Urgent Care. Endorses frequent respiratory infections, particularly as a child as well as seasonal allergies. Reports being treated with antibiotics previously for these infections (last one 2 years ago) with resolution of symptoms. Of note, had BCG vaccine in 6/2018 (green card process; patient emigrated from [place] [place] [place] in [id number]) and said started not feeling well weeks after vaccine. Denies prior PPD or QF-gold testing. No h/o TB contacts. Endorses recent sick contact w/ relative's infant with viral illness and friend's partner. Has traveled to [facility] in past 6-12mos. 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 States he traveled on flights almost 150 times this past year and lives a very busy life. Also endorsed hiking in [place] few weeks ago but denies any exposures or going into caves. Former smoker [id number], 5 cigarettes a day. No RDU. Currently, denies fevers, chills, CP, SOB, abdominal pain, dysphagia, N/V/D, night sweats, urinary symptoms, lower extremity swelling, recent weight loss.     In the ED, VS were T(F): 98 (Max: 98.2), HR: 94 (92 - 94), BP: 124/75 (112/68 - 124/75), RR: 16, SpO2: 97% (94% - 97%) on RA. CT Chest No Cont showing multilobulated masslike structure in the left lower lobe superior segment measuring approximately 6.0 x 2.5 x 4.1 cm high. The lesion extends into the left lower lobe and the superior segmental bronchi. The left hilum is prominent. Dilated bronchi are seen in the superior segment of the left lower lobe. Labs significant for WBC 8.4, NT 88%. Received Ceftriaxone 1g IV x1, Azithromycin 500mg IV x1, Solumedrol 125mg IV x1, Patient admitted for evaluation of hemoptysis and new lung mass found on CT.         PAST MEDICAL & SURGICAL HISTORY:  Seasonal allergies  Asthma  No significant past surgical history      No Known Allergies      MEDICATIONS  (STANDING):  azithromycin  IVPB 500 milliGRAM(s) IV Intermittent every 24 hours  cefTRIAXone   IVPB 1 Gram(s) IV Intermittent every 24 hours    MEDICATIONS  (PRN):  ALBUTerol/ipratropium for Nebulization 3 milliLiter(s) Nebulizer every 6 hours PRN Shortness of Breath and/or Wheezing      On Beta Blocker? NO     Labs:                        14.9   7.8   )-----------( 275      ( 10 Nov 2018 06:04 )             44.4               ABO Interpretation: O (11-08-18 @ 05:30)              Hgb A1C:  pending    EKG: < from: 12 Lead ECG (11.08.18 @ 04:49) >  Ventricular Rate 84 BPM    Atrial Rate 84 BPM    P-R Interval 154 ms    QRS Duration 102 ms    Q-T Interval 378 ms    QTC Calculation(Bezet) 446 ms    P Axis 67 degrees    R Axis 36 degrees    T Axis 27 degrees    Diagnosis Line Normal sinus rhythm  Possible Lateral infarct , age undetermined    < end of copied text >      CXR: 11/11/18: official report pending, trachea midline, no pneumothorax, no pleural effusion, no pulmonary vascular congestion, no subcutaneous emphysema.     CT Scans: < from: CT Angio Chest w/ IV Cont (11.08.18 @ 18:14) >    IMPRESSION: Enhancing left lower lobe lesion with endobronchial component   and postobstructive bronchiolar mucoid impaction. Carcinoid is in the   differential. No vascular malformation.    < end of copied text >

## 2018-11-11 NOTE — PROGRESS NOTE ADULT - SUBJECTIVE AND OBJECTIVE BOX
Pt alert, OOB, Hgb stable.    Overnight he coughed up small bloody material----shown on his IPhone-- and rachel this AM.    Afebrile.      Await the patient's procedure tomorrow.

## 2018-11-11 NOTE — PROGRESS NOTE ADULT - SKIN
----- Message from Devonte Lakhani sent at 2/12/2018  8:05 AM CST -----  Contact: Self @ [phone]  Pt is asking for refill on hydrocodone-acetaminophen 10-325mg (NORCO)  mg [facility] [address] [place] [address] [address] AT [place] [address] [place] [address] [employer] [facility] [address]  Phone: [phone] Fax: [phone]   No lesions; no rash

## 2018-11-11 NOTE — PROGRESS NOTE ADULT - ASSESSMENT
Consult in Chart?  YES   Consent in Chart? YES   Pre-op Orders Placed? YES   Blood Products Ordered? YES   NPO ordered? YES

## 2018-11-12 ENCOUNTER — RESULT REVIEW (OUTPATIENT)
Age: 23
End: 2018-11-12

## 2018-11-12 ENCOUNTER — APPOINTMENT (OUTPATIENT)
Dept: THORACIC SURGERY | Facility: HOSPITAL | Age: 23
End: 2018-11-12

## 2018-11-12 LAB
ANION GAP SERPL CALC-SCNC: 10 MMOL/L — SIGNIFICANT CHANGE UP (ref 5–17)
APTT BLD: 29.2 SEC — SIGNIFICANT CHANGE UP (ref 27.5–36.3)
BUN SERPL-MCNC: 13 MG/DL — SIGNIFICANT CHANGE UP (ref 7–23)
CALCIUM SERPL-MCNC: 9.8 MG/DL — SIGNIFICANT CHANGE UP (ref 8.4–10.5)
CHLORIDE SERPL-SCNC: 98 MMOL/L — SIGNIFICANT CHANGE UP (ref 96–108)
CO2 SERPL-SCNC: 27 MMOL/L — SIGNIFICANT CHANGE UP (ref 22–31)
CREAT SERPL-MCNC: 1.14 MG/DL — SIGNIFICANT CHANGE UP (ref 0.5–1.3)
GLUCOSE SERPL-MCNC: 93 MG/DL — SIGNIFICANT CHANGE UP (ref 70–99)
HCT VFR BLD CALC: 48.2 % — SIGNIFICANT CHANGE UP (ref 39–50)
HGB BLD-MCNC: 16.2 G/DL — SIGNIFICANT CHANGE UP (ref 13–17)
INR BLD: 1.12 — SIGNIFICANT CHANGE UP (ref 0.88–1.16)
MAGNESIUM SERPL-MCNC: 2.3 MG/DL — SIGNIFICANT CHANGE UP (ref 1.6–2.6)
MCHC RBC-ENTMCNC: 30.4 PG — SIGNIFICANT CHANGE UP (ref 27–34)
MCHC RBC-ENTMCNC: 33.6 G/DL — SIGNIFICANT CHANGE UP (ref 32–36)
MCV RBC AUTO: 90.4 FL — SIGNIFICANT CHANGE UP (ref 80–100)
NON-GYNECOLOGICAL CYTOLOGY STUDY: SIGNIFICANT CHANGE UP
PLATELET # BLD AUTO: 267 K/UL — SIGNIFICANT CHANGE UP (ref 150–400)
POTASSIUM SERPL-MCNC: 4.2 MMOL/L — SIGNIFICANT CHANGE UP (ref 3.5–5.3)
POTASSIUM SERPL-SCNC: 4.2 MMOL/L — SIGNIFICANT CHANGE UP (ref 3.5–5.3)
PROTHROM AB SERPL-ACNC: 12.7 SEC — SIGNIFICANT CHANGE UP (ref 10–12.9)
RBC # BLD: 5.33 M/UL — SIGNIFICANT CHANGE UP (ref 4.2–5.8)
RBC # FLD: 12.9 % — SIGNIFICANT CHANGE UP (ref 10.3–16.9)
SODIUM SERPL-SCNC: 135 MMOL/L — SIGNIFICANT CHANGE UP (ref 135–145)
WBC # BLD: 8 K/UL — SIGNIFICANT CHANGE UP (ref 3.8–10.5)
WBC # FLD AUTO: 8 K/UL — SIGNIFICANT CHANGE UP (ref 3.8–10.5)

## 2018-11-12 PROCEDURE — 71045 X-RAY EXAM CHEST 1 VIEW: CPT | Mod: 26

## 2018-11-12 PROCEDURE — 31625 BRONCHOSCOPY W/BIOPSY(S): CPT | Mod: 59

## 2018-11-12 PROCEDURE — 31630 BRONCHOSCOPY DILATE/FX REPR: CPT

## 2018-11-12 PROCEDURE — 31645 BRNCHSC W/THER ASPIR 1ST: CPT

## 2018-11-12 PROCEDURE — 31624 DX BRONCHOSCOPE/LAVAGE: CPT

## 2018-11-12 PROCEDURE — 99232 SBSQ HOSP IP/OBS MODERATE 35: CPT | Mod: GC

## 2018-11-12 PROCEDURE — 31641 BRONCHOSCOPY TREAT BLOCKAGE: CPT

## 2018-11-12 RX ORDER — SODIUM CHLORIDE 9 MG/ML
500 INJECTION, SOLUTION INTRAVENOUS
Qty: 0 | Refills: 0 | Status: DISCONTINUED | OUTPATIENT
Start: 2018-11-12 | End: 2018-11-13

## 2018-11-12 RX ORDER — ONDANSETRON 8 MG/1
4 TABLET, FILM COATED ORAL ONCE
Qty: 0 | Refills: 0 | Status: COMPLETED | OUTPATIENT
Start: 2018-11-12 | End: 2018-11-12

## 2018-11-12 RX ADMIN — CEFTRIAXONE 100 GRAM(S): 500 INJECTION, POWDER, FOR SOLUTION INTRAMUSCULAR; INTRAVENOUS at 18:34

## 2018-11-12 RX ADMIN — CHLORHEXIDINE GLUCONATE 1 APPLICATION(S): 213 SOLUTION TOPICAL at 06:31

## 2018-11-12 RX ADMIN — CHLORHEXIDINE GLUCONATE 10 MILLILITER(S): 213 SOLUTION TOPICAL at 06:32

## 2018-11-12 RX ADMIN — ONDANSETRON 4 MILLIGRAM(S): 8 TABLET, FILM COATED ORAL at 17:00

## 2018-11-12 RX ADMIN — AZITHROMYCIN 255 MILLIGRAM(S): 500 TABLET, FILM COATED ORAL at 18:35

## 2018-11-12 NOTE — BRIEF OPERATIVE NOTE - PRE-OP DX
Endobronchial mass  11/09/2018    Active  Deion Ta
Endobronchial mass  11/09/2018    Active  Deion Ta

## 2018-11-12 NOTE — DIETITIAN INITIAL EVALUATION ADULT. - PROBLEM SELECTOR PLAN 6
1) PCP Contacted on Admission: (N) --> Name & Phone #: No PCP, link with Dr. Varela or [facility] post discharge  2) Date of Contact with PCP:  3) PCP Contacted at Discharge: (Y/N, N/A)  4) Summary of Handoff Given to PCP:   5) Post-Discharge Appointment Date and Location:

## 2018-11-12 NOTE — PROGRESS NOTE ADULT - PROBLEM SELECTOR PLAN 2
Plan as above.
possible carcinoid. scheduled for bronchoscopy and laser tomorrow

## 2018-11-12 NOTE — PROGRESS NOTE ADULT - PROBLEM SELECTOR PLAN 1
Pt with 3 days of shot full of hemoptysis accompanied by wheezing and productive cough, saw Dr. Ratliff on outpatient with CT illustrating LLL lesion, sent to ED for workup of Mass and hemoptysis. Patient endorses night sweats intermittent since BCG vaccine but denies any weight loss. Possible etiologies include AVM (hemoptysis) malignancy (patient former smoker 5cigs/day for 2 years although less likely young age/not heavy smoker/no fam HX, but mass presence.), vs vasculitis (hemoptysis, episode of malaise, cough, night sweats, but no other signs of epistaxis/ulcerations, hematuria and less likely with mass present).   - Hb stable, but will maintain active T&S in light of current episodes  - CT angio w/ IV contrast 11/8- Enhancing left lower lobe lesion with endobronchial component and postobstructive bronchiolar mucoid impaction. Carcinoid is in the   differential. No vascular malformation.  - bronchoscopy 11/9 - no AVM; BAL cultures sent out;   - 11/12 CT laser procedure for evaluation of mass possibly carcinoid   - If severe hemoptysis again, get stat CBC and if not protecting airway/ tachypneic page anesthesia stat for possible intubation   - Heme/Onc following

## 2018-11-12 NOTE — DIETITIAN INITIAL EVALUATION ADULT. - PROBLEM SELECTOR PLAN 3
Diagnosed 1 month ago. Controlled and Stable. On Flonase and Ventolin Inhaler BID.   - No wheezing appreciated on exam. Currently asymptomatic.

## 2018-11-12 NOTE — PROGRESS NOTE ADULT - PROBLEM SELECTOR PLAN 3
-Resolved.   Likely reactive vs infectious cause possibly post-obstructive PNA due to mass found on CT;   - C/w CFTX/ Azithro
Likely reactive vs infectious cause possibly post-obstructive PNA due to mass found on CT;   - C/w CFTX/ Azithro
resolved. continue with ceftriaxone and azithromycin for PNA

## 2018-11-12 NOTE — BRIEF OPERATIVE NOTE - PROCEDURE
<<-----Click on this checkbox to enter Procedure Flexible bronchoscopy in adult  11/12/2018  with YAG laser  Active  AARTEMOU

## 2018-11-12 NOTE — PROGRESS NOTE ADULT - PROBLEM SELECTOR PROBLEM 2
Left pulmonary lesion

## 2018-11-12 NOTE — PROGRESS NOTE ADULT - PROBLEM SELECTOR PLAN 6
1) PCP Contacted on Admission: (Y/N) --> Name & Phone #:  2) Date of Contact with PCP:  3) PCP Contacted at Discharge: (Y/N, N/A) No PCP   4) Summary of Handoff Given to PCP:   5) Post-Discharge Appointment Date and Location:

## 2018-11-12 NOTE — PROGRESS NOTE ADULT - PROBLEM SELECTOR PLAN 5
F: None tolerating PO   E: Replete for K<4 Mag<2  N: NPO  for laser procedure   A: None Improve 0, and with hemoptysis; SCD

## 2018-11-12 NOTE — DIETITIAN INITIAL EVALUATION ADULT. - ENERGY NEEDS
Weight 97.2 kg, height 182.88 cm, BMI 29.1, IBW 80.9 kg, %%  Ideal body weight used for calculations as pt >120% of IBW.  Nutrient needs based on [facility] standards of care for maintenance in adults.

## 2018-11-12 NOTE — BRIEF OPERATIVE NOTE - OPERATION/FINDINGS
The bronchoscope was introduced into the airway. The bernice was visualized and was sharp. Topical lidocaine was lidocaine was instilled in the right and left mainstem bronchi. The left mainstem was entered and there was bleeding noted right away after the patient had a several strong coughs. The airway was suctioned and there was a large almost completely obstructing lobular appearing endobronchial lesion in the left lower lobe right by the take off point. The was no clear stalk identified. The bleeding was eminating from around this endobronchial lesion. The area was suctioned clean and the bleeding was self limiting. The AMANDEEP was visualized and was grossly normal in appearance. The scope was retracted and the right mainstem was entered. The RUL, RML, RLL were all visualized to the subsegmental levels and were grossly normal in appearance. The instrument was then slowly withdrawn and removed.
endobronchial mass

## 2018-11-12 NOTE — DIETITIAN INITIAL EVALUATION ADULT. - OTHER INFO
23 y.o M admitted for hemoptysis. PMHx of asthma, seasonal allergies, frequent respiratory infections, NKFA per patient. Pt is currently NPO after midnight for bronchoscopy today but was on a regular diet prior and reports tolerating with no N/V/D/C noted, denied pain, last BM 11/10 per chart. Pt reported good intake PTA and while on regular diet during stay, reported enjoying the food here and >75% tray intake. Pt does not follow a particular diet at home and has not had any recent weight changes. 23 y.o M admitted for hemoptysis. PMHx of asthma, seasonal allergies, frequent respiratory infections, NKFA per patient. Pt is currently NPO after midnight for bronchoscopy today but was on a regular diet prior and reports tolerating with no N/V/D/C noted, denied pain, last BM 11/10 per chart.Skin intact. Pt reported good intake PTA and while on regular diet during stay, reported enjoying the food here and >75% tray intake. Pt does not follow a particular diet at home and has not had any recent weight changes.

## 2018-11-12 NOTE — PROGRESS NOTE ADULT - SUBJECTIVE AND OBJECTIVE BOX
TRANSFER NOTE  HOSPITAL COURSE:   23yoM with PMH of Adult onset asthma (dx a month ago, Flonase, Ventolin BID), frequent respiratory infections (Bronchitis childhood, 2 bronchitis as young adult) who presents 11/7 for hemoptysis, mentioning since BCG vaccine June 9th, 3 days later he began having cough/malaise/subjective fever/night sweats that then seemed to subside, with residual wheezing and diagnosis of Asthma, but over the last week he began having night sweats/productive cough and over the last 2-3 days had shot glass full of hemoptysis s/p seeing Dr. Ratliff on outpatient with CT illustrating LLL lesion, sent to ED for workup of Mass and hemoptysis. At ED Vitals: T 98 (Max: 98.2), HR: 94 (92 - 94), BP: 124/75 (112/68 - 124/75), RR: 16, SpO2: 97% (94% - 97%) on RA. CT Chest No Cont showing multilobulated masslike structure in the left lower lobe superior segment measuring approximately 6.0 x 2.5 x 4.1 cm high. The lesion extends into the left lower lobe and the superior segmental bronchi. The left hilum is prominent. Dilated bronchi are seen in the superior segment of the left lower lobe. Labs significant Leukocytosis WBC 8.4, NT 88%. Received Ceftriaxone 1g IV x1, Azithromycin 500mg IV x1, Solumedrol 125mg IV x1. 11/8 CT angio was done for evaluation of mass initially thought to be AVM. Bronchoscopy was done on the following day which did not show AVM, and instead a mass, for which BAL cultures were sent. On 11/11 patient had another episode of blood tinged sputum with clots that resolved on its own, with no further hemoptysis. On 11/12 patient pending CT surgery laser procedure. Patient was continued on treatment with CFTX/Azithro prophylactically for CAP. He remained with VSS throughout hospital course. Pt transferred after CT laser procedure for further monitoring.     OVERNIGHT EVENTS: NAEO no hemoptysis overnight (last was yesterday 11/11 );     SUBJECTIVE / INTERVAL HPI: Patient seen and examined at bedside. Pt mentions feeling well no hemoptysis; Occasional positional cough when supine; Plan for laser procedure today with CT sgy.     VITAL SIGNS:  Vital Signs Last 24 Hrs  T(C): 36.9 (12 Nov 2018 10:07), Max: 37.1 (11 Nov 2018 15:53)  T(F): 98.4 (12 Nov 2018 10:07), Max: 98.7 (11 Nov 2018 15:53)  HR: 78 (12 Nov 2018 10:07) (72 - 86)  BP: 109/72 (12 Nov 2018 10:07) (98/65 - 118/74)  BP(mean): --  RR: 16 (12 Nov 2018 10:07) (16 - 18)  SpO2: 96% (12 Nov 2018 10:07) (96% - 98%)    PHYSICAL EXAM:  General: young healthy appearing male in NAD   HEENT: NC/AT; PERRL, anicteric sclera; MMM  Neck: supple  Cardiovascular: +S1/S2, RRR  Respiratory: CTA B/L; no wheezing appreciated  Gastrointestinal: soft, NT/ND; +BSx4  Extremities: WWP; no edema, clubbing or cyanosis  Vascular: 2+ radial, DP/PT pulses B/L  Neurological: AAOx3; no focal deficits    MEDICATIONS:  MEDICATIONS  (STANDING):  azithromycin  IVPB 500 milliGRAM(s) IV Intermittent every 24 hours  cefTRIAXone   IVPB 1 Gram(s) IV Intermittent every 24 hours    MEDICATIONS  (PRN):  ALBUTerol/ipratropium for Nebulization 3 milliLiter(s) Nebulizer every 6 hours PRN Shortness of Breath and/or Wheezing      ALLERGIES:  Allergies    No Known Allergies    Intolerances        LABS:                        16.2   8.0   )-----------( 267      ( 12 Nov 2018 06:54 )             48.2     11-12    135  |  98  |  13  ----------------------------<  93  4.2   |  27  |  1.14    Ca    9.8      12 Nov 2018 06:53  Mg     2.3     11-12      PT/INR - ( 12 Nov 2018 06:54 )   PT: 12.7 sec;   INR: 1.12          PTT - ( 12 Nov 2018 06:54 )  PTT:29.2 sec    CAPILLARY BLOOD GLUCOSE          RADIOLOGY & ADDITIONAL TESTS: Reviewed.

## 2018-11-12 NOTE — PROGRESS NOTE ADULT - PROBLEM SELECTOR PROBLEM 3
Leukocytosis (leucocytosis)

## 2018-11-12 NOTE — PROGRESS NOTE ADULT - SUBJECTIVE AND OBJECTIVE BOX
Wheezing persists on auscultation but no further hemoptysis noted.       Patient alert and wife and father at bedside.

## 2018-11-13 ENCOUNTER — TRANSCRIPTION ENCOUNTER (OUTPATIENT)
Age: 23
End: 2018-11-13

## 2018-11-13 VITALS
RESPIRATION RATE: 16 BRPM | HEART RATE: 92 BPM | DIASTOLIC BLOOD PRESSURE: 74 MMHG | SYSTOLIC BLOOD PRESSURE: 118 MMHG | OXYGEN SATURATION: 97 % | TEMPERATURE: 98 F

## 2018-11-13 LAB
ANION GAP SERPL CALC-SCNC: 15 MMOL/L — SIGNIFICANT CHANGE UP (ref 5–17)
BUN SERPL-MCNC: 13 MG/DL — SIGNIFICANT CHANGE UP (ref 7–23)
CALCIUM SERPL-MCNC: 9.8 MG/DL — SIGNIFICANT CHANGE UP (ref 8.4–10.5)
CHLORIDE SERPL-SCNC: 100 MMOL/L — SIGNIFICANT CHANGE UP (ref 96–108)
CO2 SERPL-SCNC: 24 MMOL/L — SIGNIFICANT CHANGE UP (ref 22–31)
CREAT SERPL-MCNC: 0.97 MG/DL — SIGNIFICANT CHANGE UP (ref 0.5–1.3)
FUNGITELL B-D-GLUCAN,  BRONCHIAL LAVAGE: SIGNIFICANT CHANGE UP
GLUCOSE SERPL-MCNC: 108 MG/DL — HIGH (ref 70–99)
HCT VFR BLD CALC: 44.3 % — SIGNIFICANT CHANGE UP (ref 39–50)
HGB BLD-MCNC: 15.3 G/DL — SIGNIFICANT CHANGE UP (ref 13–17)
MAGNESIUM SERPL-MCNC: 2.2 MG/DL — SIGNIFICANT CHANGE UP (ref 1.6–2.6)
MCHC RBC-ENTMCNC: 30.5 PG — SIGNIFICANT CHANGE UP (ref 27–34)
MCHC RBC-ENTMCNC: 34.5 G/DL — SIGNIFICANT CHANGE UP (ref 32–36)
MCV RBC AUTO: 88.4 FL — SIGNIFICANT CHANGE UP (ref 80–100)
NIGHT BLUE STAIN TISS: SIGNIFICANT CHANGE UP
PLATELET # BLD AUTO: 300 K/UL — SIGNIFICANT CHANGE UP (ref 150–400)
POTASSIUM SERPL-MCNC: 4.7 MMOL/L — SIGNIFICANT CHANGE UP (ref 3.5–5.3)
POTASSIUM SERPL-SCNC: 4.7 MMOL/L — SIGNIFICANT CHANGE UP (ref 3.5–5.3)
RBC # BLD: 5.01 M/UL — SIGNIFICANT CHANGE UP (ref 4.2–5.8)
RBC # FLD: 12.5 % — SIGNIFICANT CHANGE UP (ref 10.3–16.9)
SODIUM SERPL-SCNC: 139 MMOL/L — SIGNIFICANT CHANGE UP (ref 135–145)
SPECIMEN SOURCE: SIGNIFICANT CHANGE UP
WBC # BLD: 18 K/UL — HIGH (ref 3.8–10.5)
WBC # FLD AUTO: 18 K/UL — HIGH (ref 3.8–10.5)

## 2018-11-13 PROCEDURE — 71045 X-RAY EXAM CHEST 1 VIEW: CPT | Mod: 26

## 2018-11-13 PROCEDURE — 99238 HOSP IP/OBS DSCHRG MGMT 30/<: CPT

## 2018-11-13 NOTE — DISCHARGE NOTE ADULT - ADDITIONAL INSTRUCTIONS
-Cardiothoracic surgery follow up Dr. Veliz [phone] 11/29 at 9:30am   - Follow up with Dr. Varela pulmonologist

## 2018-11-13 NOTE — DISCHARGE NOTE ADULT - HOSPITAL COURSE
23yoM with PMH of Adult onset asthma (dx a month ago, Flonase, Ventolin BID), frequent respiratory infections (Bronchitis childhood, 2 bronchitis as young adult) who presents 11/7 for hemoptysis, mentioning since BCG vaccine June 9th, 3 days later he began having cough/malaise/subjective fever/night sweats that then seemed to subside, with residual wheezing and diagnosis of Asthma, but over the last week he began having night sweats/productive cough and over the last 2-3 days had shot glass full of hemoptysis s/p seeing Dr. Ratliff on outpatient with CT illustrating LLL lesion, sent to ED for workup of Mass and hemoptysis. At ED Vitals: T 98 (Max: 98.2), HR: 94 (92 - 94), BP: 124/75 (112/68 - 124/75), RR: 16, SpO2: 97% (94% - 97%) on RA. CT Chest No Cont showing multilobulated masslike structure in the left lower lobe superior segment measuring approximately 6.0 x 2.5 x 4.1 cm high. The lesion extends into the left lower lobe and the superior segmental bronchi. The left hilum is prominent. Dilated bronchi are seen in the superior segment of the left lower lobe. Labs significant Leukocytosis WBC 8.4, NT 88%. Received Ceftriaxone 1g IV x1, Azithromycin 500mg IV x1, Solumedrol 125mg IV x1. 11/8 CT angio was done for evaluation of mass initially thought to be AVM. Bronchoscopy was done on the following day which did not show AVM, and instead a mass, for which BAL cultures were sent. On 11/12 CT surgery performed laser procedure with follow plan of either lobectomy vs pneumonectomy for endobronchial mass, with pending biopsy results of etiology, possibly carcinoid. Of note, patient was continued on treatment with CFTX/Azithro prophylactically for CAP. He remained with VSS throughout hospital course and discharged home on 11/13, with plan to follow up with Dr. Varela and CT surgery (appointment made).

## 2018-11-13 NOTE — DISCHARGE NOTE ADULT - PATIENT PORTAL LINK FT
You can access the [facility] Patient Portal, offered by [facility], by registering with the following website: [email or web address]

## 2018-11-13 NOTE — DISCHARGE NOTE ADULT - CARE PLAN
Principal Discharge DX:	Left pulmonary lesion  Goal:	prevent future episodes  Assessment and plan of treatment:	You presented with episodes of hemoptysis (coughing up blood) seen on outpatient, with imaging showing a Left sided mass. You were evaluated in the hospital with bronchoscopy which did not show an arterial vascular malformation, but instead a mass of unknown etiology. Cardiothoracic surgery was then consulted who performed a laser bronchoscopy with cultures and biopsy obtained, pending current results. Please follow up with CT surgery upon discharge for follow up plan. Please follow up with Dr. Varela for pulmonology follow up. It is presumed that wheezing was all secondary to the endobronchial lesion compressing to bronchiole.  Secondary Diagnosis:	Hemoptysis  Assessment and plan of treatment:	Plan as above. If you begin to have severe coughing of blood, please call 911 and come to ER, being that there is risk of being unable to protect your airway.  Secondary Diagnosis:	Asthma  Assessment and plan of treatment:	You presented with recent diagnosis of adult onset asthma which now is presumed to be secondary to the endobronchial lesion encompassing the bronchiole. If you begin to have severe respiratory distress, please call 911 to come to ER.

## 2018-11-13 NOTE — DISCHARGE NOTE ADULT - PLAN OF CARE
prevent future episodes You presented with episodes of hemoptysis (coughing up blood) seen on outpatient, with imaging showing a Left sided mass. You were evaluated in the hospital with bronchoscopy which did not show an arterial vascular malformation, but instead a mass of unknown etiology. Cardiothoracic surgery was then consulted who performed a laser bronchoscopy with cultures and biopsy obtained, pending current results. Please follow up with CT surgery upon discharge for follow up plan. Please follow up with Dr. Varela for pulmonology follow up. It is presumed that wheezing was all secondary to the endobronchial lesion compressing to bronchiole. Plan as above. If you begin to have severe coughing of blood, please call 911 and come to ER, being that there is risk of being unable to protect your airway. You presented with recent diagnosis of adult onset asthma which now is presumed to be secondary to the endobronchial lesion encompassing the bronchiole. If you begin to have severe respiratory distress, please call 911 to come to ER.

## 2018-11-13 NOTE — PROGRESS NOTE ADULT - REASON FOR ADMISSION
Hemoptysis
Hemoptysis, LLL mass
Hemoptysis
Hemoptysis, LLL mass
Hemoptysis
Hemoptysis, LLL mass
Hemoptysis, mass of lung.
Hemoptysis
Hemoptysis

## 2018-11-13 NOTE — DISCHARGE NOTE ADULT - CONDITIONS AT DISCHARGE
Patient seen and examined with house-staff during bedside rounds.  Resident note read, including vitals, physical findings, laboratory data, and radiological reports.   Revisions included below.  Direct personal management at bed side and extensive interpretation of the data.  Plan was outlined and discussed in details with the housestaff.  Decision making of high complexity  Action taken for acute disease activity to reflect the level of care provided:  - medication reconciliation  - review laboratory data Patient seen and examined with house-staff during bedside rounds.  Resident note read, including vitals, physical findings, laboratory data, and radiological reports.   Revisions included below.  Direct personal management at bed side and extensive interpretation of the data.  Plan was outlined and discussed in details with the housestaff.  Decision making of high complexity  Action taken for acute disease activity to reflect the level of care provided:  - medication reconciliation  - review laboratory data  Cytology from bronchoscopy was negative. No hemoptysis. Patient is to follow up next week in the office

## 2018-11-13 NOTE — DISCHARGE NOTE ADULT - CARE PROVIDER_API CALL
Elaine Varela), Critical Care Medicine; Pulmonary Disease  [address] [address]  Phone: [phone]  Fax: [phone]    Musa Veliz), Thoracic Surgery  [address] [address]  Phone: [phone]  Fax: [phone]

## 2018-11-13 NOTE — DISCHARGE NOTE ADULT - MEDICATION SUMMARY - MEDICATIONS TO TAKE
I will START or STAY ON the medications listed below when I get home from the hospital:    Ventolin HFA 90 mcg/inh inhalation aerosol  -- 2 puff(s) inhaled 4 times a day, As Needed  -- Indication: For Asthma

## 2018-11-13 NOTE — PROGRESS NOTE ADULT - ATTENDING COMMENTS
Await the  bronchoscopy today.
I saw pt this AM and will discuss plans
will follow.
will follow.
Patient seen and examined with house-staff during bedside rounds.  Resident note read, including vitals, physical findings, laboratory data, and radiological reports.   Revisions included below.  Direct personal management at bed side and extensive interpretation of the data.  Plan was outlined and discussed in details with the housestaff.  Decision making of high complexity  Action taken for acute disease activity to reflect the level of care provided:  - medication reconciliation  - review laboratory data  The patient was seen twice. I discussed the case in details with the father. I discussed the findings with the thoracic surgeon and patient and father.. Await pathology. Patient is to be discharge tomorrow. Patient need five days of antibiotic. Cultures are negative today. No further hemoptysis.
Patient seen and examined with house-staff during bedside rounds.  Resident note read, including vitals, physical findings, laboratory data, and radiological reports.   Revisions included below.  Direct personal management at bed side and extensive interpretation of the data.  Plan was outlined and discussed in details with the housestaff.  Decision making of high complexity  Action taken for acute disease activity to reflect the level of care provided:  - medication reconciliation  - review laboratory data  I discussed the case in details with the patient and thoracic surgery. 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 Patient is to continue on antibiotic. Follow up on cytology. The plan is laser therapy evaluate the location of the stump and thoracic surgery as an outpatient. Patient clinically improved and no evidence of like active hemoptysis and decision was to transfer to the floor patient was seen later in the day and you was clinically stable with no shortness of breath and no hemoptysis
Patient seen and examined with house-staff during bedside rounds.  Resident note read, including vitals, physical findings, laboratory data, and radiological reports.   Revisions included below.  Direct personal management at bed side and extensive interpretation of the data.  Plan was outlined and discussed in details with the housestaff.  Decision making of high complexity  Action taken for acute disease activity to reflect the level of care provided:  - medication reconciliation  - review laboratory data  Patient is clinically stable. No further hemoptysis. The patient is for laser of the left lower lobe endobronchial lesion. Will follow with octeptride t scan the patient is stable. Follow on bronchial wash culture.
Patient seen and examined with house-staff during bedside rounds.  Resident note read, including vitals, physical findings, laboratory data, and radiological reports.   Revisions included below.  Direct personal management at bed side and extensive interpretation of the data.  Plan was outlined and discussed in details with the housestaff.  Decision making of high complexity  Action taken for acute disease activity to reflect the level of care provided:  - medication reconciliation  - review laboratory data  That CT scan of the chest was reviewed as possible AVM. CT scan of the chest with AVM particle was ordered. Plan bronchoscopy tomorrow. Continue antibiotic. Hemoptysis resolved. Patient is able protect his airway

## 2018-11-13 NOTE — DISCHARGE NOTE ADULT - CARE PROVIDERS DIRECT ADDRESSES
[email or web address]

## 2018-11-13 NOTE — PROGRESS NOTE ADULT - PROVIDER SPECIALTY LIST ADULT
Heme/Onc
Internal Medicine
Pulmonology
Thoracic Surgery
Heme/Onc
Internal Medicine

## 2018-11-14 LAB — SURGICAL PATHOLOGY STUDY: SIGNIFICANT CHANGE UP

## 2018-11-16 PROCEDURE — C1726: CPT

## 2018-11-16 PROCEDURE — 85610 PROTHROMBIN TIME: CPT

## 2018-11-16 PROCEDURE — 71275 CT ANGIOGRAPHY CHEST: CPT

## 2018-11-16 PROCEDURE — 88360 TUMOR IMMUNOHISTOCHEM/MANUAL: CPT

## 2018-11-16 PROCEDURE — 88341 IMHCHEM/IMCYTCHM EA ADD ANTB: CPT

## 2018-11-16 PROCEDURE — 87252 VIRUS INOCULATION TISSUE: CPT

## 2018-11-16 PROCEDURE — 87070 CULTURE OTHR SPECIMN AEROBIC: CPT

## 2018-11-16 PROCEDURE — 96375 TX/PRO/DX INJ NEW DRUG ADDON: CPT

## 2018-11-16 PROCEDURE — 86850 RBC ANTIBODY SCREEN: CPT

## 2018-11-16 PROCEDURE — 71250 CT THORAX DX C-: CPT

## 2018-11-16 PROCEDURE — 87206 SMEAR FLUORESCENT/ACID STAI: CPT

## 2018-11-16 PROCEDURE — 87116 MYCOBACTERIA CULTURE: CPT

## 2018-11-16 PROCEDURE — 88342 IMHCHEM/IMCYTCHM 1ST ANTB: CPT

## 2018-11-16 PROCEDURE — 83735 ASSAY OF MAGNESIUM: CPT

## 2018-11-16 PROCEDURE — C1889: CPT

## 2018-11-16 PROCEDURE — 87015 SPECIMEN INFECT AGNT CONCNTJ: CPT

## 2018-11-16 PROCEDURE — 94640 AIRWAY INHALATION TREATMENT: CPT

## 2018-11-16 PROCEDURE — 80048 BASIC METABOLIC PNL TOTAL CA: CPT

## 2018-11-16 PROCEDURE — 87449 NOS EACH ORGANISM AG IA: CPT

## 2018-11-16 PROCEDURE — 99285 EMERGENCY DEPT VISIT HI MDM: CPT | Mod: 25

## 2018-11-16 PROCEDURE — 87305 ASPERGILLUS AG IA: CPT

## 2018-11-16 PROCEDURE — 85027 COMPLETE CBC AUTOMATED: CPT

## 2018-11-16 PROCEDURE — 88112 CYTOPATH CELL ENHANCE TECH: CPT

## 2018-11-16 PROCEDURE — 85025 COMPLETE CBC W/AUTO DIFF WBC: CPT

## 2018-11-16 PROCEDURE — 80053 COMPREHEN METABOLIC PANEL: CPT

## 2018-11-16 PROCEDURE — 85730 THROMBOPLASTIN TIME PARTIAL: CPT

## 2018-11-16 PROCEDURE — 89051 BODY FLUID CELL COUNT: CPT

## 2018-11-16 PROCEDURE — 88305 TISSUE EXAM BY PATHOLOGIST: CPT

## 2018-11-16 PROCEDURE — 96374 THER/PROPH/DIAG INJ IV PUSH: CPT

## 2018-11-16 PROCEDURE — 87102 FUNGUS ISOLATION CULTURE: CPT

## 2018-11-16 PROCEDURE — 36415 COLL VENOUS BLD VENIPUNCTURE: CPT

## 2018-11-16 PROCEDURE — 93005 ELECTROCARDIOGRAM TRACING: CPT

## 2018-11-16 PROCEDURE — 86901 BLOOD TYPING SEROLOGIC RH(D): CPT

## 2018-11-16 PROCEDURE — 86900 BLOOD TYPING SEROLOGIC ABO: CPT

## 2018-11-16 PROCEDURE — 71045 X-RAY EXAM CHEST 1 VIEW: CPT

## 2018-11-17 DIAGNOSIS — D49.1 NEOPLASM OF UNSPECIFIED BEHAVIOR OF RESPIRATORY SYSTEM: ICD-10-CM

## 2018-11-17 DIAGNOSIS — J45.909 UNSPECIFIED ASTHMA, UNCOMPLICATED: ICD-10-CM

## 2018-11-17 DIAGNOSIS — J18.9 PNEUMONIA, UNSPECIFIED ORGANISM: ICD-10-CM

## 2018-11-17 DIAGNOSIS — R04.2 HEMOPTYSIS: ICD-10-CM

## 2018-11-26 LAB — RESPIRATORY PATH PNL SPEC CULT: SIGNIFICANT CHANGE UP

## 2018-11-28 ENCOUNTER — APPOINTMENT (OUTPATIENT)
Dept: PULMONOLOGY | Facility: CLINIC | Age: 23
End: 2018-11-28
Payer: COMMERCIAL

## 2018-11-28 VITALS
DIASTOLIC BLOOD PRESSURE: 70 MMHG | OXYGEN SATURATION: 98 % | WEIGHT: 216 LBS | BODY MASS INDEX: 30.24 KG/M2 | HEART RATE: 75 BPM | SYSTOLIC BLOOD PRESSURE: 110 MMHG | TEMPERATURE: 98.3 F | HEIGHT: 71 IN

## 2018-11-28 DIAGNOSIS — D3A.090 BENIGN CARCINOID TUMOR OF THE BRONCHUS AND LUNG: ICD-10-CM

## 2018-11-28 DIAGNOSIS — Z78.9 OTHER SPECIFIED HEALTH STATUS: ICD-10-CM

## 2018-11-28 DIAGNOSIS — R04.2 HEMOPTYSIS: ICD-10-CM

## 2018-11-28 DIAGNOSIS — Z87.01 PERSONAL HISTORY OF PNEUMONIA (RECURRENT): ICD-10-CM

## 2018-11-28 LAB — RESPIRATORY PATH PNL SPEC CULT: SIGNIFICANT CHANGE UP

## 2018-11-28 PROCEDURE — 99215 OFFICE O/P EST HI 40 MIN: CPT

## 2018-11-29 ENCOUNTER — OUTPATIENT (OUTPATIENT)
Dept: OUTPATIENT SERVICES | Facility: HOSPITAL | Age: 23
LOS: 1 days | End: 2018-11-29
Payer: COMMERCIAL

## 2018-11-29 ENCOUNTER — NON-APPOINTMENT (OUTPATIENT)
Age: 23
End: 2018-11-29

## 2018-11-29 ENCOUNTER — APPOINTMENT (OUTPATIENT)
Dept: THORACIC SURGERY | Facility: CLINIC | Age: 23
End: 2018-11-29
Payer: COMMERCIAL

## 2018-11-29 VITALS
TEMPERATURE: 97.4 F | BODY MASS INDEX: 29.71 KG/M2 | WEIGHT: 213 LBS | OXYGEN SATURATION: 97 % | RESPIRATION RATE: 18 BRPM | HEART RATE: 74 BPM | DIASTOLIC BLOOD PRESSURE: 50 MMHG | SYSTOLIC BLOOD PRESSURE: 101 MMHG

## 2018-11-29 DIAGNOSIS — Z01.818 ENCOUNTER FOR OTHER PREPROCEDURAL EXAMINATION: ICD-10-CM

## 2018-11-29 LAB
ALBUMIN SERPL ELPH-MCNC: 4.7 G/DL — SIGNIFICANT CHANGE UP (ref 3.3–5)
ALP SERPL-CCNC: 53 U/L — SIGNIFICANT CHANGE UP (ref 40–120)
ALT FLD-CCNC: 24 U/L — SIGNIFICANT CHANGE UP (ref 10–45)
ANION GAP SERPL CALC-SCNC: 11 MMOL/L — SIGNIFICANT CHANGE UP (ref 5–17)
APPEARANCE UR: CLEAR — SIGNIFICANT CHANGE UP
APTT BLD: 32 SEC — SIGNIFICANT CHANGE UP (ref 27.5–36.3)
AST SERPL-CCNC: 19 U/L — SIGNIFICANT CHANGE UP (ref 10–40)
BASOPHILS NFR BLD AUTO: 0.4 % — SIGNIFICANT CHANGE UP (ref 0–2)
BILIRUB DIRECT SERPL-MCNC: <0.2 MG/DL — SIGNIFICANT CHANGE UP (ref 0–0.2)
BILIRUB SERPL-MCNC: 1.2 MG/DL — SIGNIFICANT CHANGE UP (ref 0.2–1.2)
BILIRUB UR-MCNC: NEGATIVE — SIGNIFICANT CHANGE UP
BUN SERPL-MCNC: 17 MG/DL — SIGNIFICANT CHANGE UP (ref 7–23)
CALCIUM SERPL-MCNC: 9.6 MG/DL — SIGNIFICANT CHANGE UP (ref 8.4–10.5)
CHLORIDE SERPL-SCNC: 102 MMOL/L — SIGNIFICANT CHANGE UP (ref 96–108)
CO2 SERPL-SCNC: 27 MMOL/L — SIGNIFICANT CHANGE UP (ref 22–31)
COLOR SPEC: YELLOW — SIGNIFICANT CHANGE UP
CREAT SERPL-MCNC: 0.99 MG/DL — SIGNIFICANT CHANGE UP (ref 0.5–1.3)
DIFF PNL FLD: NEGATIVE — SIGNIFICANT CHANGE UP
EOSINOPHIL NFR BLD AUTO: 1.4 % — SIGNIFICANT CHANGE UP (ref 0–6)
GLUCOSE SERPL-MCNC: 77 MG/DL — SIGNIFICANT CHANGE UP (ref 70–99)
GLUCOSE UR QL: NEGATIVE — SIGNIFICANT CHANGE UP
HCT VFR BLD CALC: 44.7 % — SIGNIFICANT CHANGE UP (ref 39–50)
HGB BLD-MCNC: 15.3 G/DL — SIGNIFICANT CHANGE UP (ref 13–17)
INR BLD: 1.08 — SIGNIFICANT CHANGE UP (ref 0.88–1.16)
KETONES UR-MCNC: NEGATIVE — SIGNIFICANT CHANGE UP
LEUKOCYTE ESTERASE UR-ACNC: NEGATIVE — SIGNIFICANT CHANGE UP
LYMPHOCYTES # BLD AUTO: 29.1 % — SIGNIFICANT CHANGE UP (ref 13–44)
MCHC RBC-ENTMCNC: 30.8 PG — SIGNIFICANT CHANGE UP (ref 27–34)
MCHC RBC-ENTMCNC: 34.2 G/DL — SIGNIFICANT CHANGE UP (ref 32–36)
MCV RBC AUTO: 90.1 FL — SIGNIFICANT CHANGE UP (ref 80–100)
MONOCYTES NFR BLD AUTO: 12.8 % — SIGNIFICANT CHANGE UP (ref 2–14)
NEUTROPHILS NFR BLD AUTO: 56.3 % — SIGNIFICANT CHANGE UP (ref 43–77)
NITRITE UR-MCNC: NEGATIVE — SIGNIFICANT CHANGE UP
PH UR: 6.5 — SIGNIFICANT CHANGE UP (ref 5–8)
PLATELET # BLD AUTO: 305 K/UL — SIGNIFICANT CHANGE UP (ref 150–400)
POTASSIUM SERPL-MCNC: 4.2 MMOL/L — SIGNIFICANT CHANGE UP (ref 3.5–5.3)
POTASSIUM SERPL-SCNC: 4.2 MMOL/L — SIGNIFICANT CHANGE UP (ref 3.5–5.3)
PROT SERPL-MCNC: 7.6 G/DL — SIGNIFICANT CHANGE UP (ref 6–8.3)
PROT UR-MCNC: NEGATIVE MG/DL — SIGNIFICANT CHANGE UP
PROTHROM AB SERPL-ACNC: 12.2 SEC — SIGNIFICANT CHANGE UP (ref 10–12.9)
RBC # BLD: 4.96 M/UL — SIGNIFICANT CHANGE UP (ref 4.2–5.8)
RBC # FLD: 12.8 % — SIGNIFICANT CHANGE UP (ref 10.3–16.9)
SODIUM SERPL-SCNC: 140 MMOL/L — SIGNIFICANT CHANGE UP (ref 135–145)
SP GR SPEC: 1.02 — SIGNIFICANT CHANGE UP (ref 1–1.03)
UROBILINOGEN FLD QL: 0.2 E.U./DL — SIGNIFICANT CHANGE UP
WBC # BLD: 5.5 K/UL — SIGNIFICANT CHANGE UP (ref 3.8–10.5)
WBC # FLD AUTO: 5.5 K/UL — SIGNIFICANT CHANGE UP (ref 3.8–10.5)

## 2018-11-29 PROCEDURE — 82248 BILIRUBIN DIRECT: CPT

## 2018-11-29 PROCEDURE — 99215 OFFICE O/P EST HI 40 MIN: CPT

## 2018-11-29 PROCEDURE — 85730 THROMBOPLASTIN TIME PARTIAL: CPT

## 2018-11-29 PROCEDURE — 85025 COMPLETE CBC W/AUTO DIFF WBC: CPT

## 2018-11-29 PROCEDURE — 81003 URINALYSIS AUTO W/O SCOPE: CPT

## 2018-11-29 PROCEDURE — 85610 PROTHROMBIN TIME: CPT

## 2018-11-29 PROCEDURE — 80053 COMPREHEN METABOLIC PANEL: CPT

## 2018-11-30 ENCOUNTER — APPOINTMENT (OUTPATIENT)
Dept: PULMONOLOGY | Facility: CLINIC | Age: 23
End: 2018-11-30

## 2018-12-05 ENCOUNTER — INBOUND DOCUMENT (OUTPATIENT)
Age: 23
End: 2018-12-05

## 2018-12-07 ENCOUNTER — APPOINTMENT (OUTPATIENT)
Dept: PULMONOLOGY | Facility: CLINIC | Age: 23
End: 2018-12-07

## 2018-12-08 LAB
CULTURE RESULTS: SIGNIFICANT CHANGE UP
SPECIMEN SOURCE: SIGNIFICANT CHANGE UP

## 2018-12-12 ENCOUNTER — APPOINTMENT (OUTPATIENT)
Dept: THORACIC SURGERY | Facility: HOSPITAL | Age: 23
End: 2018-12-12

## 2018-12-12 LAB
CULTURE RESULTS: SIGNIFICANT CHANGE UP
SPECIMEN SOURCE: SIGNIFICANT CHANGE UP

## 2018-12-29 LAB
CULTURE RESULTS: SIGNIFICANT CHANGE UP
SPECIMEN SOURCE: SIGNIFICANT CHANGE UP

## 2019-01-02 LAB
CULTURE RESULTS: SIGNIFICANT CHANGE UP
SPECIMEN SOURCE: SIGNIFICANT CHANGE UP

## 2019-04-09 NOTE — PATIENT PROFILE ADULT - NSPROSPHOSPCHAPLAINYN_GEN_A_NUR
Received signed and completed form from Physician's Office.     Authorization needed to release the form.     no

## 2019-12-09 NOTE — PROGRESS NOTE ADULT - SUBJECTIVE AND OBJECTIVE BOX
Interval Events: Reviewed  Patient seen and examined at bedside.    Patient is a 23y old Male with PMH for recent vaccination and recent asthma dx presented with a chief complaint of Hemoptysis, dx with LLL lung nodule and post obstructive PNA on antibiotics and doing well. pt states he did cough up blood with clots once last night and once this morning. states he feels the wheezing on the L side which he gets prior to having hemoptysis. states the steroid he got on the 7th had helped decrease his hemoptysis. pt denies any SOB/dyspnea, CP, fever/chills/sweats.       PAST MEDICAL & SURGICAL HISTORY:  Seasonal allergies  Asthma  No significant past surgical history      MEDICATIONS:  Pulmonary:  ALBUTerol/ipratropium for Nebulization 3 milliLiter(s) Nebulizer every 6 hours PRN    Antimicrobials:  azithromycin  IVPB 500 milliGRAM(s) IV Intermittent every 24 hours  cefTRIAXone   IVPB 1 Gram(s) IV Intermittent every 24 hours    Anticoagulants:    Cardiac:      Allergies    No Known Allergies    Intolerances        Vital Signs Last 24 Hrs  T(C): 36.9 (11 Nov 2018 09:47), Max: 36.9 (11 Nov 2018 09:47)  T(F): 98.5 (11 Nov 2018 09:47), Max: 98.5 (11 Nov 2018 09:47)  HR: 84 (11 Nov 2018 09:47) (58 - 84)  BP: 110/72 (11 Nov 2018 09:47) (98/63 - 122/76)  BP(mean): --  RR: 17 (11 Nov 2018 09:47) (17 - 18)  SpO2: 97% (11 Nov 2018 09:47) (95% - 97%)        LABS:      CBC Full  -  ( 10 Nov 2018 06:04 )  WBC Count : 7.8 K/uL  Hemoglobin : 14.9 g/dL  Hematocrit : 44.4 %  Platelet Count - Automated : 275 K/uL  Mean Cell Volume : 91.5 fL  Mean Cell Hemoglobin : 30.7 pg  Mean Cell Hemoglobin Concentration : 33.6 g/dL  Auto Neutrophil # : x  Auto Lymphocyte # : x  Auto Monocyte # : x  Auto Eosinophil # : x  Auto Basophil # : x  Auto Neutrophil % : x  Auto Lymphocyte % : x  Auto Monocyte % : x  Auto Eosinophil % : x  Auto Basophil % : x                              RADIOLOGY & ADDITIONAL STUDIES (The following images were personally reviewed):  Fernando:                                     No  Urine output:                       adequate  DVT prophylaxis:                 Yes  Flattus:                                  Yes  Bowel movement:              No EOMI; PERRL; no drainage or redness

## 2024-02-17 NOTE — PATIENT PROFILE ADULT - NSPROMUTPARTICIPCAREFT_GEN_A_NUR
Upper and lower extremities grossly assessed.  Bilaterally, pt presents with a 3/5 on MMT score./grossly assessed due to
none at this time

## 2024-05-28 ENCOUNTER — APPOINTMENT (RX ONLY)
Dept: URBAN - METROPOLITAN AREA CLINIC 133 | Facility: CLINIC | Age: 29
Setting detail: DERMATOLOGY
End: 2024-05-28

## 2024-05-28 VITALS — HEIGHT: 71 IN | WEIGHT: 195 LBS

## 2024-05-28 DIAGNOSIS — D485 NEOPLASM OF UNCERTAIN BEHAVIOR OF SKIN: ICD-10-CM

## 2024-05-28 DIAGNOSIS — D22 MELANOCYTIC NEVI: ICD-10-CM

## 2024-05-28 DIAGNOSIS — L90.5 SCAR CONDITIONS AND FIBROSIS OF SKIN: ICD-10-CM

## 2024-05-28 DIAGNOSIS — L81.8 OTHER SPECIFIED DISORDERS OF PIGMENTATION: ICD-10-CM

## 2024-05-28 PROBLEM — D22.62 MELANOCYTIC NEVI OF LEFT UPPER LIMB, INCLUDING SHOULDER: Status: ACTIVE | Noted: 2024-05-28

## 2024-05-28 PROBLEM — D48.5 NEOPLASM OF UNCERTAIN BEHAVIOR OF SKIN: Status: ACTIVE | Noted: 2024-05-28

## 2024-05-28 PROCEDURE — ? COUNSELING

## 2024-05-28 PROCEDURE — ? TREATMENT REGIMEN

## 2024-05-28 PROCEDURE — 99203 OFFICE O/P NEW LOW 30 MIN: CPT

## 2024-05-28 PROCEDURE — ? EDUCATIONAL RESOURCES PROVIDED

## 2024-05-28 PROCEDURE — ? DEFER

## 2024-05-28 ASSESSMENT — LOCATION ZONE DERM
LOCATION ZONE: HAND
LOCATION ZONE: ARM
LOCATION ZONE: TRUNK

## 2024-05-28 ASSESSMENT — LOCATION DETAILED DESCRIPTION DERM
LOCATION DETAILED: LEFT MID-UPPER BACK
LOCATION DETAILED: LEFT VENTRAL MEDIAL DISTAL FOREARM
LOCATION DETAILED: LEFT DORSAL MIDDLE METACARPOPHALANGEAL JOINT

## 2024-05-28 ASSESSMENT — LOCATION SIMPLE DESCRIPTION DERM
LOCATION SIMPLE: LEFT HAND
LOCATION SIMPLE: LEFT FOREARM
LOCATION SIMPLE: LEFT UPPER BACK

## 2024-05-28 NOTE — PROCEDURE: TREATMENT REGIMEN
Plan: Secondary to partial lung removal sx \\nDiscussed rx for topical steroid, pt declined at this time
Detail Level: Zone

## 2024-05-28 NOTE — HPI: SKIN LESION
What Type Of Note Output Would You Prefer (Optional)?: Standard Output
How Severe Is Your Skin Lesion?: mild
Has Your Skin Lesion Been Treated?: not been treated
Is This A New Presentation, Or A Follow-Up?: Skin Lesions
Additional History: R eyelid, prior laser tx 2022—> via [facility] in [place]\\nL midback, per pt HO lung cancer had partial lung removal 2018, wanting scar looked at. Has occasional itch

## 2024-07-31 NOTE — DISCHARGE NOTE ADULT - MEDICATION SUMMARY - MEDICATIONS TO STOP TAKING
500
I will STOP taking the medications listed below when I get home from the hospital:    Levaquin 500 mg oral tablet  -- 1 tab(s) by mouth every 24 hours    predniSONE 20 mg oral tablet  -- 2 tab(s) by mouth once a day